# Patient Record
Sex: FEMALE | Race: WHITE | NOT HISPANIC OR LATINO
[De-identification: names, ages, dates, MRNs, and addresses within clinical notes are randomized per-mention and may not be internally consistent; named-entity substitution may affect disease eponyms.]

---

## 2017-01-30 ENCOUNTER — RECORD ABSTRACTING (OUTPATIENT)
Age: 72
End: 2017-01-30

## 2017-01-30 DIAGNOSIS — M17.9 OSTEOARTHRITIS OF KNEE, UNSPECIFIED: ICD-10-CM

## 2017-03-06 ENCOUNTER — OUTPATIENT (OUTPATIENT)
Dept: OUTPATIENT SERVICES | Facility: HOSPITAL | Age: 72
LOS: 1 days | End: 2017-03-06
Payer: MEDICARE

## 2017-03-06 VITALS
TEMPERATURE: 98 F | HEIGHT: 63.5 IN | WEIGHT: 277.78 LBS | HEART RATE: 60 BPM | RESPIRATION RATE: 20 BRPM | DIASTOLIC BLOOD PRESSURE: 75 MMHG | SYSTOLIC BLOOD PRESSURE: 136 MMHG

## 2017-03-06 DIAGNOSIS — Z01.818 ENCOUNTER FOR OTHER PREPROCEDURAL EXAMINATION: ICD-10-CM

## 2017-03-06 DIAGNOSIS — Z98.89 OTHER SPECIFIED POSTPROCEDURAL STATES: Chronic | ICD-10-CM

## 2017-03-06 DIAGNOSIS — Z95.0 PRESENCE OF CARDIAC PACEMAKER: Chronic | ICD-10-CM

## 2017-03-06 DIAGNOSIS — M17.12 UNILATERAL PRIMARY OSTEOARTHRITIS, LEFT KNEE: ICD-10-CM

## 2017-03-06 DIAGNOSIS — Z98.890 OTHER SPECIFIED POSTPROCEDURAL STATES: Chronic | ICD-10-CM

## 2017-03-06 DIAGNOSIS — Z90.49 ACQUIRED ABSENCE OF OTHER SPECIFIED PARTS OF DIGESTIVE TRACT: Chronic | ICD-10-CM

## 2017-03-06 LAB
ALBUMIN SERPL ELPH-MCNC: 4.3 G/DL — SIGNIFICANT CHANGE UP (ref 3.3–5.2)
ALP SERPL-CCNC: 87 U/L — SIGNIFICANT CHANGE UP (ref 40–120)
ALT FLD-CCNC: 19 U/L — SIGNIFICANT CHANGE UP
ANION GAP SERPL CALC-SCNC: 15 MMOL/L — SIGNIFICANT CHANGE UP (ref 5–17)
APPEARANCE UR: CLEAR — SIGNIFICANT CHANGE UP
APTT BLD: 43.2 SEC — HIGH (ref 27.5–37.4)
AST SERPL-CCNC: 28 U/L — SIGNIFICANT CHANGE UP
BILIRUB SERPL-MCNC: 0.6 MG/DL — SIGNIFICANT CHANGE UP (ref 0.4–2)
BILIRUB UR-MCNC: NEGATIVE — SIGNIFICANT CHANGE UP
BLD GP AB SCN SERPL QL: SIGNIFICANT CHANGE UP
BUN SERPL-MCNC: 19 MG/DL — SIGNIFICANT CHANGE UP (ref 8–20)
CALCIUM SERPL-MCNC: 9.6 MG/DL — SIGNIFICANT CHANGE UP (ref 8.6–10.2)
CHLORIDE SERPL-SCNC: 100 MMOL/L — SIGNIFICANT CHANGE UP (ref 98–107)
CO2 SERPL-SCNC: 26 MMOL/L — SIGNIFICANT CHANGE UP (ref 22–29)
COLOR SPEC: YELLOW — SIGNIFICANT CHANGE UP
CREAT SERPL-MCNC: 0.65 MG/DL — SIGNIFICANT CHANGE UP (ref 0.5–1.3)
DIFF PNL FLD: ABNORMAL
GLUCOSE SERPL-MCNC: 82 MG/DL — SIGNIFICANT CHANGE UP (ref 70–115)
GLUCOSE UR QL: NEGATIVE MG/DL — SIGNIFICANT CHANGE UP
HCT VFR BLD CALC: 37.3 % — SIGNIFICANT CHANGE UP (ref 37–47)
HGB BLD-MCNC: 12.3 G/DL — SIGNIFICANT CHANGE UP (ref 12–16)
INR BLD: 1.87 RATIO — HIGH (ref 0.88–1.16)
KETONES UR-MCNC: NEGATIVE — SIGNIFICANT CHANGE UP
LEUKOCYTE ESTERASE UR-ACNC: ABNORMAL
MCHC RBC-ENTMCNC: 30.1 PG — SIGNIFICANT CHANGE UP (ref 27–31)
MCHC RBC-ENTMCNC: 33 G/DL — SIGNIFICANT CHANGE UP (ref 32–36)
MCV RBC AUTO: 91.4 FL — SIGNIFICANT CHANGE UP (ref 81–99)
NITRITE UR-MCNC: NEGATIVE — SIGNIFICANT CHANGE UP
PH UR: 5 — SIGNIFICANT CHANGE UP (ref 4.8–8)
PLATELET # BLD AUTO: 253 K/UL — SIGNIFICANT CHANGE UP (ref 150–400)
POTASSIUM SERPL-MCNC: 3.9 MMOL/L — SIGNIFICANT CHANGE UP (ref 3.5–5.3)
POTASSIUM SERPL-SCNC: 3.9 MMOL/L — SIGNIFICANT CHANGE UP (ref 3.5–5.3)
PROT SERPL-MCNC: 7.9 G/DL — SIGNIFICANT CHANGE UP (ref 6.6–8.7)
PROT UR-MCNC: NEGATIVE MG/DL — SIGNIFICANT CHANGE UP
PROTHROM AB SERPL-ACNC: 20.7 SEC — HIGH (ref 10–13.1)
RBC # BLD: 4.08 M/UL — LOW (ref 4.4–5.2)
RBC # FLD: 13.8 % — SIGNIFICANT CHANGE UP (ref 11–15.6)
SODIUM SERPL-SCNC: 141 MMOL/L — SIGNIFICANT CHANGE UP (ref 135–145)
SP GR SPEC: 1.01 — SIGNIFICANT CHANGE UP (ref 1.01–1.02)
TYPE + AB SCN PNL BLD: SIGNIFICANT CHANGE UP
UROBILINOGEN FLD QL: NEGATIVE MG/DL — SIGNIFICANT CHANGE UP
WBC # BLD: 6.4 K/UL — SIGNIFICANT CHANGE UP (ref 4.8–10.8)
WBC # FLD AUTO: 6.4 K/UL — SIGNIFICANT CHANGE UP (ref 4.8–10.8)

## 2017-03-06 PROCEDURE — 87640 STAPH A DNA AMP PROBE: CPT

## 2017-03-06 PROCEDURE — 80053 COMPREHEN METABOLIC PANEL: CPT

## 2017-03-06 PROCEDURE — 85610 PROTHROMBIN TIME: CPT

## 2017-03-06 PROCEDURE — 93005 ELECTROCARDIOGRAM TRACING: CPT

## 2017-03-06 PROCEDURE — 86850 RBC ANTIBODY SCREEN: CPT

## 2017-03-06 PROCEDURE — 81001 URINALYSIS AUTO W/SCOPE: CPT

## 2017-03-06 PROCEDURE — 85027 COMPLETE CBC AUTOMATED: CPT

## 2017-03-06 PROCEDURE — 86901 BLOOD TYPING SEROLOGIC RH(D): CPT

## 2017-03-06 PROCEDURE — 87086 URINE CULTURE/COLONY COUNT: CPT

## 2017-03-06 PROCEDURE — 86900 BLOOD TYPING SEROLOGIC ABO: CPT

## 2017-03-06 PROCEDURE — G0463: CPT

## 2017-03-06 PROCEDURE — 87641 MR-STAPH DNA AMP PROBE: CPT

## 2017-03-06 PROCEDURE — 93010 ELECTROCARDIOGRAM REPORT: CPT

## 2017-03-06 PROCEDURE — 85730 THROMBOPLASTIN TIME PARTIAL: CPT

## 2017-03-06 NOTE — H&P PST ADULT - FAMILY HISTORY
Father  Still living? No  CAD (coronary artery disease), Age at diagnosis: Age Unknown  Family history of coronary artery disease, Age at diagnosis: Age Unknown  Family history of diabetes mellitus, Age at diagnosis: Age Unknown     Mother  Still living? Unknown  Family history of aortic dissection, Age at diagnosis: Age Unknown  Family history of lung cancer, Age at diagnosis: Age Unknown

## 2017-03-06 NOTE — H&P PST ADULT - NEGATIVE GENERAL GENITOURINARY SYMPTOMS
no gas in urine/no urine discoloration/no urinary hesitancy/no bladder infections/no renal colic/normal libido/normal urinary frequency/no nocturia/no flank pain L/no flank pain R/no dysuria/no hematuria

## 2017-03-06 NOTE — H&P PST ADULT - MUSCULOSKELETAL
details… detailed exam decreased ROM due to pain/no calf tenderness/no joint swelling/no joint erythema/no joint warmth

## 2017-03-06 NOTE — H&P PST ADULT - PSH
H/O prior ablation treatment  cardiac  H/O umbilical hernia repair    History of appendectomy    Left heart catheterization    Presence of cardiac pacemaker  [employer] dual chamber  S/P anal fissurectomy    S/P cholecystectomy    S/P knee surgery  right  2013  S/P tonsillectomy

## 2017-03-06 NOTE — H&P PST ADULT - NEGATIVE OPHTHALMOLOGIC SYMPTOMS
no pain R/no blurred vision R/no loss of vision R/no scleral injection R/no lacrimation R/no pain L/no discharge L/no irritation L/no irritation R/no loss of vision L/no lacrimation L/no blurred vision L/no scleral injection L/no discharge R/no diplopia/no photophobia

## 2017-03-06 NOTE — H&P PST ADULT - NEGATIVE PSYCHIATRIC SYMPTOMS
no paranoia/no mood swings/no anxiety/no suicidal ideation/no memory loss/no agitation/no auditory hallucinations/no hyperactivity/no depression/no visual hallucinations/no insomnia

## 2017-03-06 NOTE — H&P PST ADULT - NEGATIVE GENERAL SYMPTOMS
no polyuria/no weight gain/no chills/no anorexia/no sweating/no weight loss/no polyphagia/no fever/no polydipsia/no malaise

## 2017-03-06 NOTE — H&P PST ADULT - LAB RESULTS AND INTERPRETATION
Patient lab results + for MRSA and MSSA , coag elevated PT 20.7, APTT 43.2 and INR 1.87 results faxed to Dr. Mc's office s/w [place] in office results received. All lab results faxed to Dr. Lawton. Patient aware of + MRSA and MSSA nasal swab results, mupirocin 2% ordered patient instructed to apply nasal swab as ordered for five days.

## 2017-03-06 NOTE — H&P PST ADULT - NEGATIVE CARDIOVASCULAR SYMPTOMS
no chest pain/no palpitations/no paroxysmal nocturnal dyspnea/no claudication/no peripheral edema/no orthopnea/no dyspnea on exertion

## 2017-03-06 NOTE — H&P PST ADULT - NEGATIVE MUSCULOSKELETAL SYMPTOMS
no joint swelling/no neck pain/no myalgia/no arthralgia/no muscle weakness/no muscle cramps/no arm pain L

## 2017-03-06 NOTE — H&P PST ADULT - NEUROLOGICAL DETAILS
deep reflexes intact/cranial nerves intact/alert and oriented x 3/superficial reflexes intact/normal strength/responds to verbal commands/no spontaneous movement/responds to pain/sensation intact

## 2017-03-06 NOTE — H&P PST ADULT - NEGATIVE GASTROINTESTINAL SYMPTOMS
no nausea/no hematochezia/no hiccoughs/no vomiting/no constipation/no melena/no steatorrhea/no abdominal pain/no diarrhea/no flatulence/no jaundice/no change in bowel habits

## 2017-03-06 NOTE — H&P PST ADULT - NEGATIVE ENMT SYMPTOMS
no ear pain/no nasal discharge/no dysphagia/no gum bleeding/no abnormal taste sensation/no throat pain/no nose bleeds/no dry mouth/no post-nasal discharge/no recurrent cold sores/no nasal congestion/no vertigo/no tinnitus/no nasal obstruction/no hearing difficulty/no sinus symptoms

## 2017-03-06 NOTE — H&P PST ADULT - NEGATIVE NEUROLOGICAL SYMPTOMS
no transient paralysis/no vertigo/no difficulty walking/no paresthesias/no syncope/no hemiparesis/no focal seizures/no weakness/no loss of consciousness/no headache/no generalized seizures/no facial palsy/no tremors/no confusion/no loss of sensation

## 2017-03-06 NOTE — H&P PST ADULT - PMH
Arthritis    Hyperlipidemia    Hypertension    Mitral regurgitation    Narrow angle glaucoma suspect    Near syncope    Obesity    Paroxysmal atrial fibrillation    Renal stone    Sleep apnea, unspecified type    SSS (sick sinus syndrome)

## 2017-03-06 NOTE — H&P PST ADULT - NEGATIVE BREAST SYMPTOMS
no nipple discharge R/no breast tenderness R/no nipple discharge L/no breast lump L/no breast tenderness L/no breast lump R

## 2017-03-06 NOTE — PATIENT PROFILE ADULT. - LEARNING ASSESSMENT (PATIENT) ADDITIONAL COMMENTS
Instructed pt on pre-op instructions, tips for safer surgery, pain management scale and pre-surgical infection prevention instruction, MRSA/ MSSA instructions, Flu vaccine risks/benefits info sheet and verbalized understanding of all. Ebola screening: negative.

## 2017-03-06 NOTE — H&P PST ADULT - NEGATIVE FEMALE-SPECIFIC SYMPTOMS
no pelvic pain/no irregular menses/no vaginal discharge/no amenorrhea/no spotting/no abnormal vaginal bleeding/no dysmenorrhea/no menorrhagia

## 2017-03-06 NOTE — H&P PST ADULT - NEGATIVE SKIN SYMPTOMS
no itching/no pitted nails/no hair loss/no brittle nails/no dryness/no rash/no change in size/color of mole/no tumor

## 2017-03-06 NOTE — H&P PST ADULT - RS GEN PE MLT RESP DETAILS PC
breath sounds equal/no rales/no chest wall tenderness/no subcutaneous emphysema/good air movement/no intercostal retractions/no rhonchi/airway patent/no wheezes/respirations non-labored/clear to auscultation bilaterally

## 2017-03-06 NOTE — H&P PST ADULT - GASTROINTESTINAL DETAILS
no rigidity/no rebound tenderness/bowel sounds normal/no masses palpable/soft/no guarding/no distention/no bruit/no organomegaly

## 2017-03-07 ENCOUNTER — APPOINTMENT (OUTPATIENT)
Dept: PULMONOLOGY | Facility: CLINIC | Age: 72
End: 2017-03-07

## 2017-03-07 VITALS — WEIGHT: 276 LBS | HEIGHT: 64 IN | BODY MASS INDEX: 47.12 KG/M2

## 2017-03-07 VITALS
RESPIRATION RATE: 16 BRPM | HEART RATE: 63 BPM | SYSTOLIC BLOOD PRESSURE: 128 MMHG | DIASTOLIC BLOOD PRESSURE: 80 MMHG | OXYGEN SATURATION: 99 %

## 2017-03-07 DIAGNOSIS — Z01.811 ENCOUNTER FOR PREPROCEDURAL RESPIRATORY EXAMINATION: ICD-10-CM

## 2017-03-07 DIAGNOSIS — M17.12 UNILATERAL PRIMARY OSTEOARTHRITIS, LEFT KNEE: ICD-10-CM

## 2017-03-07 DIAGNOSIS — Z01.818 ENCOUNTER FOR OTHER PREPROCEDURAL EXAMINATION: ICD-10-CM

## 2017-03-07 LAB
CULTURE RESULTS: NO GROWTH — SIGNIFICANT CHANGE UP
MRSA PCR RESULT.: DETECTED
S AUREUS DNA NOSE QL NAA+PROBE: DETECTED
SPECIMEN SOURCE: SIGNIFICANT CHANGE UP

## 2017-03-07 RX ORDER — MUPIROCIN 20 MG/G
1 OINTMENT TOPICAL
Qty: 1 | Refills: 0 | OUTPATIENT
Start: 2017-03-07 | End: 2017-03-12

## 2017-03-08 ENCOUNTER — APPOINTMENT (OUTPATIENT)
Dept: FAMILY MEDICINE | Facility: CLINIC | Age: 72
End: 2017-03-08

## 2017-03-08 VITALS — HEIGHT: 64 IN | WEIGHT: 276 LBS | HEART RATE: 64 BPM | BODY MASS INDEX: 47.12 KG/M2

## 2017-03-08 DIAGNOSIS — I10 ESSENTIAL (PRIMARY) HYPERTENSION: ICD-10-CM

## 2017-03-09 PROBLEM — I10 BENIGN ESSENTIAL HYPERTENSION: Status: ACTIVE | Noted: 2017-03-09

## 2017-03-15 RX ORDER — GABAPENTIN 400 MG/1
600 CAPSULE ORAL ONCE
Qty: 0 | Refills: 0 | Status: COMPLETED | OUTPATIENT
Start: 2017-03-20 | End: 2017-03-20

## 2017-03-15 RX ORDER — OXYCODONE HYDROCHLORIDE 5 MG/1
10 TABLET ORAL ONCE
Qty: 0 | Refills: 0 | Status: DISCONTINUED | OUTPATIENT
Start: 2017-03-20 | End: 2017-03-20

## 2017-03-15 RX ORDER — CELECOXIB 200 MG/1
400 CAPSULE ORAL ONCE
Qty: 0 | Refills: 0 | Status: COMPLETED | OUTPATIENT
Start: 2017-03-20 | End: 2017-03-20

## 2017-03-19 ENCOUNTER — RESULT REVIEW (OUTPATIENT)
Age: 72
End: 2017-03-19

## 2017-03-20 ENCOUNTER — APPOINTMENT (OUTPATIENT)
Dept: ORTHOPEDIC SURGERY | Facility: HOSPITAL | Age: 72
End: 2017-03-20

## 2017-03-20 ENCOUNTER — INPATIENT (INPATIENT)
Facility: HOSPITAL | Age: 72
LOS: 3 days | Discharge: HOME | DRG: 470 | End: 2017-03-24
Attending: ORTHOPAEDIC SURGERY | Admitting: ORTHOPAEDIC SURGERY
Payer: MEDICARE

## 2017-03-20 VITALS
SYSTOLIC BLOOD PRESSURE: 153 MMHG | OXYGEN SATURATION: 100 % | TEMPERATURE: 98 F | WEIGHT: 276.9 LBS | HEIGHT: 63 IN | HEART RATE: 69 BPM | RESPIRATION RATE: 16 BRPM | DIASTOLIC BLOOD PRESSURE: 69 MMHG

## 2017-03-20 DIAGNOSIS — Z98.890 OTHER SPECIFIED POSTPROCEDURAL STATES: Chronic | ICD-10-CM

## 2017-03-20 DIAGNOSIS — Z98.89 OTHER SPECIFIED POSTPROCEDURAL STATES: Chronic | ICD-10-CM

## 2017-03-20 DIAGNOSIS — I48.0 PAROXYSMAL ATRIAL FIBRILLATION: ICD-10-CM

## 2017-03-20 DIAGNOSIS — I10 ESSENTIAL (PRIMARY) HYPERTENSION: ICD-10-CM

## 2017-03-20 DIAGNOSIS — M17.12 UNILATERAL PRIMARY OSTEOARTHRITIS, LEFT KNEE: ICD-10-CM

## 2017-03-20 DIAGNOSIS — Z90.49 ACQUIRED ABSENCE OF OTHER SPECIFIED PARTS OF DIGESTIVE TRACT: Chronic | ICD-10-CM

## 2017-03-20 DIAGNOSIS — Z95.0 PRESENCE OF CARDIAC PACEMAKER: Chronic | ICD-10-CM

## 2017-03-20 LAB
APTT BLD: 31.5 SEC — SIGNIFICANT CHANGE UP (ref 27.5–37.4)
BLD GP AB SCN SERPL QL: SIGNIFICANT CHANGE UP
INR BLD: 1.09 RATIO — SIGNIFICANT CHANGE UP (ref 0.88–1.16)
PROTHROM AB SERPL-ACNC: 12 SEC — SIGNIFICANT CHANGE UP (ref 10–13.1)
TYPE + AB SCN PNL BLD: SIGNIFICANT CHANGE UP

## 2017-03-20 PROCEDURE — 27447 TOTAL KNEE ARTHROPLASTY: CPT | Mod: AS,LT

## 2017-03-20 PROCEDURE — 20985 CPTR-ASST DIR MS PX: CPT | Mod: LT

## 2017-03-20 PROCEDURE — 88311 DECALCIFY TISSUE: CPT | Mod: 26

## 2017-03-20 PROCEDURE — 73560 X-RAY EXAM OF KNEE 1 OR 2: CPT | Mod: 26,LT

## 2017-03-20 PROCEDURE — 93010 ELECTROCARDIOGRAM REPORT: CPT

## 2017-03-20 PROCEDURE — 20985 CPTR-ASST DIR MS PX: CPT | Mod: AS,LT

## 2017-03-20 PROCEDURE — 99222 1ST HOSP IP/OBS MODERATE 55: CPT

## 2017-03-20 PROCEDURE — 27447 TOTAL KNEE ARTHROPLASTY: CPT | Mod: LT

## 2017-03-20 PROCEDURE — 88305 TISSUE EXAM BY PATHOLOGIST: CPT | Mod: 26

## 2017-03-20 RX ORDER — CELECOXIB 200 MG/1
200 CAPSULE ORAL
Qty: 0 | Refills: 0 | Status: DISCONTINUED | OUTPATIENT
Start: 2017-03-22 | End: 2017-03-24

## 2017-03-20 RX ORDER — CEFAZOLIN SODIUM 1 G
3000 VIAL (EA) INJECTION
Qty: 0 | Refills: 0 | Status: COMPLETED | OUTPATIENT
Start: 2017-03-20 | End: 2017-03-21

## 2017-03-20 RX ORDER — OXYCODONE HYDROCHLORIDE 5 MG/1
10 TABLET ORAL EVERY 4 HOURS
Qty: 0 | Refills: 0 | Status: DISCONTINUED | OUTPATIENT
Start: 2017-03-20 | End: 2017-03-21

## 2017-03-20 RX ORDER — ACETAMINOPHEN 500 MG
975 TABLET ORAL EVERY 8 HOURS
Qty: 0 | Refills: 0 | Status: DISCONTINUED | OUTPATIENT
Start: 2017-03-20 | End: 2017-03-24

## 2017-03-20 RX ORDER — SODIUM CHLORIDE 9 MG/ML
1000 INJECTION, SOLUTION INTRAVENOUS
Qty: 0 | Refills: 0 | Status: DISCONTINUED | OUTPATIENT
Start: 2017-03-20 | End: 2017-03-21

## 2017-03-20 RX ORDER — ONDANSETRON 8 MG/1
4 TABLET, FILM COATED ORAL EVERY 6 HOURS
Qty: 0 | Refills: 0 | Status: DISCONTINUED | OUTPATIENT
Start: 2017-03-20 | End: 2017-03-24

## 2017-03-20 RX ORDER — OXYCODONE HYDROCHLORIDE 5 MG/1
5 TABLET ORAL EVERY 4 HOURS
Qty: 0 | Refills: 0 | Status: DISCONTINUED | OUTPATIENT
Start: 2017-03-20 | End: 2017-03-21

## 2017-03-20 RX ORDER — DOCUSATE SODIUM 100 MG
100 CAPSULE ORAL THREE TIMES A DAY
Qty: 0 | Refills: 0 | Status: DISCONTINUED | OUTPATIENT
Start: 2017-03-20 | End: 2017-03-24

## 2017-03-20 RX ORDER — PANTOPRAZOLE SODIUM 20 MG/1
40 TABLET, DELAYED RELEASE ORAL
Qty: 0 | Refills: 0 | Status: DISCONTINUED | OUTPATIENT
Start: 2017-03-20 | End: 2017-03-24

## 2017-03-20 RX ORDER — MAGNESIUM HYDROXIDE 400 MG/1
30 TABLET, CHEWABLE ORAL DAILY
Qty: 0 | Refills: 0 | Status: DISCONTINUED | OUTPATIENT
Start: 2017-03-20 | End: 2017-03-24

## 2017-03-20 RX ORDER — ATORVASTATIN CALCIUM 80 MG/1
20 TABLET, FILM COATED ORAL AT BEDTIME
Qty: 0 | Refills: 0 | Status: DISCONTINUED | OUTPATIENT
Start: 2017-03-20 | End: 2017-03-24

## 2017-03-20 RX ORDER — ONDANSETRON 8 MG/1
4 TABLET, FILM COATED ORAL ONCE
Qty: 0 | Refills: 0 | Status: COMPLETED | OUTPATIENT
Start: 2017-03-20 | End: 2017-03-20

## 2017-03-20 RX ORDER — ACETAMINOPHEN 500 MG
1000 TABLET ORAL ONCE
Qty: 0 | Refills: 0 | Status: DISCONTINUED | OUTPATIENT
Start: 2017-03-20 | End: 2017-03-20

## 2017-03-20 RX ORDER — RIVAROXABAN 15 MG-20MG
10 KIT ORAL DAILY
Qty: 0 | Refills: 0 | Status: DISCONTINUED | OUTPATIENT
Start: 2017-03-21 | End: 2017-03-23

## 2017-03-20 RX ORDER — SODIUM CHLORIDE 9 MG/ML
3 INJECTION INTRAMUSCULAR; INTRAVENOUS; SUBCUTANEOUS EVERY 8 HOURS
Qty: 0 | Refills: 0 | Status: DISCONTINUED | OUTPATIENT
Start: 2017-03-20 | End: 2017-03-20

## 2017-03-20 RX ORDER — HYDROMORPHONE HYDROCHLORIDE 2 MG/ML
0.5 INJECTION INTRAMUSCULAR; INTRAVENOUS; SUBCUTANEOUS
Qty: 0 | Refills: 0 | Status: DISCONTINUED | OUTPATIENT
Start: 2017-03-20 | End: 2017-03-21

## 2017-03-20 RX ORDER — TRANEXAMIC ACID 100 MG/ML
1250 INJECTION, SOLUTION INTRAVENOUS ONCE
Qty: 0 | Refills: 0 | Status: DISCONTINUED | OUTPATIENT
Start: 2017-03-20 | End: 2017-03-20

## 2017-03-20 RX ORDER — METOPROLOL TARTRATE 50 MG
50 TABLET ORAL
Qty: 0 | Refills: 0 | Status: DISCONTINUED | OUTPATIENT
Start: 2017-03-20 | End: 2017-03-24

## 2017-03-20 RX ORDER — ACETAMINOPHEN 500 MG
1000 TABLET ORAL
Qty: 0 | Refills: 0 | Status: COMPLETED | OUTPATIENT
Start: 2017-03-20 | End: 2017-03-21

## 2017-03-20 RX ORDER — HYDROMORPHONE HYDROCHLORIDE 2 MG/ML
0.5 INJECTION INTRAMUSCULAR; INTRAVENOUS; SUBCUTANEOUS EVERY 4 HOURS
Qty: 0 | Refills: 0 | Status: DISCONTINUED | OUTPATIENT
Start: 2017-03-20 | End: 2017-03-21

## 2017-03-20 RX ORDER — VANCOMYCIN HCL 1 G
1750 VIAL (EA) INTRAVENOUS
Qty: 0 | Refills: 0 | Status: COMPLETED | OUTPATIENT
Start: 2017-03-20 | End: 2017-03-21

## 2017-03-20 RX ORDER — KETOROLAC TROMETHAMINE 30 MG/ML
15 SYRINGE (ML) INJECTION EVERY 6 HOURS
Qty: 0 | Refills: 0 | Status: DISCONTINUED | OUTPATIENT
Start: 2017-03-20 | End: 2017-03-21

## 2017-03-20 RX ORDER — CEFAZOLIN SODIUM 1 G
3000 VIAL (EA) INJECTION ONCE
Qty: 0 | Refills: 0 | Status: DISCONTINUED | OUTPATIENT
Start: 2017-03-20 | End: 2017-03-20

## 2017-03-20 RX ORDER — SODIUM CHLORIDE 9 MG/ML
1000 INJECTION, SOLUTION INTRAVENOUS
Qty: 0 | Refills: 0 | Status: DISCONTINUED | OUTPATIENT
Start: 2017-03-20 | End: 2017-03-23

## 2017-03-20 RX ORDER — BUPIVACAINE 13.3 MG/ML
20 INJECTION, SUSPENSION, LIPOSOMAL INFILTRATION ONCE
Qty: 0 | Refills: 0 | Status: DISCONTINUED | OUTPATIENT
Start: 2017-03-20 | End: 2017-03-20

## 2017-03-20 RX ORDER — POLYETHYLENE GLYCOL 3350 17 G/17G
17 POWDER, FOR SOLUTION ORAL DAILY
Qty: 0 | Refills: 0 | Status: DISCONTINUED | OUTPATIENT
Start: 2017-03-20 | End: 2017-03-24

## 2017-03-20 RX ORDER — METOPROLOL TARTRATE 50 MG
25 TABLET ORAL AT BEDTIME
Qty: 0 | Refills: 0 | Status: DISCONTINUED | OUTPATIENT
Start: 2017-03-20 | End: 2017-03-24

## 2017-03-20 RX ORDER — SENNA PLUS 8.6 MG/1
2 TABLET ORAL AT BEDTIME
Qty: 0 | Refills: 0 | Status: DISCONTINUED | OUTPATIENT
Start: 2017-03-20 | End: 2017-03-24

## 2017-03-20 RX ORDER — VANCOMYCIN HCL 1 G
1750 VIAL (EA) INTRAVENOUS ONCE
Qty: 0 | Refills: 0 | Status: COMPLETED | OUTPATIENT
Start: 2017-03-20 | End: 2017-03-20

## 2017-03-20 RX ADMIN — ONDANSETRON 4 MILLIGRAM(S): 8 TABLET, FILM COATED ORAL at 19:20

## 2017-03-20 RX ADMIN — ONDANSETRON 4 MILLIGRAM(S): 8 TABLET, FILM COATED ORAL at 18:30

## 2017-03-20 RX ADMIN — HYDROMORPHONE HYDROCHLORIDE 0.5 MILLIGRAM(S): 2 INJECTION INTRAMUSCULAR; INTRAVENOUS; SUBCUTANEOUS at 21:36

## 2017-03-20 RX ADMIN — SODIUM CHLORIDE 80 MILLILITER(S): 9 INJECTION, SOLUTION INTRAVENOUS at 23:52

## 2017-03-20 RX ADMIN — CELECOXIB 400 MILLIGRAM(S): 200 CAPSULE ORAL at 11:16

## 2017-03-20 RX ADMIN — OXYCODONE HYDROCHLORIDE 10 MILLIGRAM(S): 5 TABLET ORAL at 11:16

## 2017-03-20 RX ADMIN — GABAPENTIN 600 MILLIGRAM(S): 400 CAPSULE ORAL at 11:16

## 2017-03-20 NOTE — CONSULT NOTE ADULT - PROBLEM SELECTOR RECOMMENDATION 9
s/p knee arthroplasty  pain control, PT/Ot   perioperative antibiotics therapy   laxative stool softener to prevent opiate induced constipation  DVT prohylaxis xarelto per ortho team; 10 mg daily convert to full dose in 2 days

## 2017-03-20 NOTE — CONSULT NOTE ADULT - SUBJECTIVE AND OBJECTIVE BOX
PMD :Dr Lawton  Chart records in the file reviewed ,  pt is seen and examined   CC : knee pain , lucked on me       HPI:  70 y/o female with h/o OA of the knee now presents for left total knee replacement due to worsening knee pain progressively worse , recently lucked on her and had to come to ED, was not able to walk on it .       PAST MEDICAL & SURGICAL HISTORY:  Renal stone  SSS (sick sinus syndrome)  Near syncope  Sleep apnea, unspecified type  Paroxysmal atrial fibrillation  Arthritis  Narrow angle glaucoma suspect  Obesity  Mitral regurgitation  Hyperlipidemia  Hypertension  Atrial fibrillation  H/O prior ablation treatment: cardiac  Left heart catheterization  Presence of cardiac pacemaker: [employer] dual chamber  S/P anal fissurectomy  S/P cholecystectomy  S/P knee surgery: right  2013  History of appendectomy  S/P qing  H/O umbilical hernia repair  S/P tonsillectomy      Social History:  Tabacco - denies  ETOH - denies  Illicit drug abuse - denies    FAMILY HISTORY:  Family history of diabetes mellitus (Father)  Family history of coronary artery disease (Father)  Family history of lung cancer (Mother)  Family history of aortic dissection (Mother)  CAD (coronary artery disease) (Father)      Allergies    tobramycin (Unknown)    Intolerances        HOME MEDICATIONS : reviewed in the file     REVIEW OF SYSTEMS:      MEDICATIONS  (STANDING):  vancomycin  IVPB 1750milliGRAM(s) IV Intermittent once  lactated ringers. 1000milliLiter(s) IV Continuous <Continuous>  ondansetron Injectable 4milliGRAM(s) IV Push once    MEDICATIONS  (PRN):  HYDROmorphone  Injectable 0.5milliGRAM(s) IV Push every 10 minutes PRN Moderate Pain (4 - 6)  ondansetron Injectable 4milliGRAM(s) IV Push once PRN Nausea and/or Vomiting      Vital Signs Last 24 Hrs  T(C): 36.7, Max: 36.7 (03-20 @ 10:32)  T(F): 98.1, Max: 98.1 (03-20 @ 10:32)  HR: 69 (69 - 69)  BP: 153/69 (153/69 - 153/69)  BP(mean): --  RR: 16 (16 - 16)  SpO2: 100% (100% - 100%)    PHYSICAL EXAM:    GENERAL: NAD, well-groomed, well-developed  HEAD:  Atraumatic, Normocephalic  EYES: EOMI, PERRLA, conjunctiva and sclera clear  NECK: Supple, No JVD, Normal thyroid  NERVOUS SYSTEM:  Alert & Oriented X3, mowes upper extr, numb in lower extremity due to spinal anesthesia   CHEST/LUNG: CTA  b/l,  no rales, rhonchi, wheezing, or rubs  HEART: Regular rate and rhythm; No murmurs, rubs, or gallops  ABDOMEN: Soft, Nontender, Nondistended; Bowel sounds present  EXTREMITIES:  2+ Peripheral Pulses, No clubbing, cyanosis, or edema ,   LABS:      preop labs reviewed in file   Complete Blood Count (03.06.17 @ 17:12)    WBC Count: 6.4 K/uL    RBC Count: 4.08 M/uL    Hemoglobin: 12.3 g/dL    Hematocrit: 37.3 %    Mean Cell Volume: 91.4 fl    Mean Cell Hemoglobin: 30.1 pg    Mean Cell Hemoglobin Conc: 33.0 g/dL    Red Cell Distrib Width: 13.8 %    Platelet Count - Automated: 253 K/uL    Comprehensive Metabolic Panel (03.06.17 @ 17:12)    Sodium, Serum: 141 mmol/L    Potassium, Serum: 3.9 mmol/L    Chloride, Serum: 100 mmol/L    Carbon Dioxide, Serum: 26.0 mmol/L    Anion Gap, Serum: 15 mmol/L    Blood Urea Nitrogen, Serum: 19.0 mg/dL    Creatinine, Serum: 0.65 mg/dL    Glucose, Serum: 82 mg/dL    Calcium, Total Serum: 9.6 mg/dL    Protein Total, Serum: 7.9 g/dL    Albumin, Serum: 4.3 g/dL    Bilirubin Total, Serum: 0.6 mg/dL    Alkaline Phosphatase, Serum: 87 U/L    Aspartate Aminotransferase (AST/SGOT): 28 U/L    Alanine Aminotransferase (ALT/SGPT): 19 U/L     PT/INR - ( 20 Mar 2017 11:36 )   PT: 12.0 sec;   INR: 1.09 ratio         PTT - ( 20 Mar 2017 11:36 )  PTT:31.5 sec        RADIOLOGY & ADDITIONAL STUDIES:

## 2017-03-20 NOTE — PROGRESS NOTE ADULT - SUBJECTIVE AND OBJECTIVE BOX
CHARLES BLAKEISREALDIANNE  [id number]    History: 71y Female is status post left total knee arthroplasty on 3/20/17, POD # 0. Patient is doing well and is comfortable. The patient's pain is controlled using the prescribed pain medications. Denies nausea, vomiting, chest pain, shortness of breath, abdominal pain or fever. No new complaints.    MEDICATIONS  (STANDING):  vancomycin  IVPB 1750milliGRAM(s) IV Intermittent once  lactated ringers. 1000milliLiter(s) IV Continuous <Continuous>  lactated ringers. 1000milliLiter(s) IV Continuous <Continuous>  acetaminophen   Tablet. 975milliGRAM(s) Oral every 8 hours  ketorolac   Injectable 15milliGRAM(s) IV Push every 6 hours  polyethylene glycol 3350 17Gram(s) Oral daily  docusate sodium 100milliGRAM(s) Oral three times a day  multivitamin 1Tablet(s) Oral daily  acetaminophen  IVPB. 1000milliGRAM(s) IV Intermittent <User Schedule>  ondansetron Injectable 4milliGRAM(s) IV Push once  atorvastatin 20milliGRAM(s) Oral at bedtime  metoprolol 25milliGRAM(s) Oral at bedtime  metoprolol 50milliGRAM(s) Oral before breakfast  pantoprazole    Tablet 40milliGRAM(s) Oral before breakfast  vancomycin  IVPB 1750milliGRAM(s) IV Intermittent <User Schedule>    MEDICATIONS  (PRN):  HYDROmorphone  Injectable 0.5milliGRAM(s) IV Push every 10 minutes PRN Moderate Pain (4 - 6)  oxyCODONE IR 5milliGRAM(s) Oral every 4 hours PRN Mild Pain  oxyCODONE IR 10milliGRAM(s) Oral every 4 hours PRN Moderate Pain  HYDROmorphone  Injectable 0.5milliGRAM(s) IV Push every 4 hours PRN Severe Pain  aluminum hydroxide/magnesium hydroxide/simethicone Suspension 30milliLiter(s) Oral four times a day PRN Indigestion  ondansetron Injectable 4milliGRAM(s) IV Push every 6 hours PRN Nausea and/or Vomiting  magnesium hydroxide Suspension 30milliLiter(s) Oral daily PRN Constipation  senna 2Tablet(s) Oral at bedtime PRN Constipation    Physical exam: The left knee dressing is clean, dry and intact. No drainage or discharge. The calf is supple nontender. Passive range of motion is acceptable to due postoperative pain. Sensation to light touch is grossly intact distally. Motor function distally is 5/5. Extensor hallucis longus and flexor hallucis longus are intact. No foot drop. 2+ dorsalis pedis pulse. Capillary refill is less than 2 seconds. No cyanosis.    Primary Orthopedic Assessment:  •	s/p LEFT total knee replacement    Plan:   •	DVT prophylaxis as prescribed, including use of compression devices and ankle pumps  •	Physical therapy  •	Weightbearing as tolerated of the left lower extremity with assistance of a walker  •	Incentive spirometry encouraged  •	Pain control as clinically indicated  •	Discharge planning

## 2017-03-20 NOTE — CONSULT NOTE ADULT - ASSESSMENT
72 yo female with h/p Atrial fib/PPM , HTN and osteoarthritis of knee s/p left knee arthroplasty post op seen in recovery for medical evaluation   discussed with orthopedics physician assistant

## 2017-03-21 ENCOUNTER — TRANSCRIPTION ENCOUNTER (OUTPATIENT)
Age: 72
End: 2017-03-21

## 2017-03-21 RX ORDER — OXYCODONE HYDROCHLORIDE 5 MG/1
10 TABLET ORAL EVERY 4 HOURS
Qty: 0 | Refills: 0 | Status: DISCONTINUED | OUTPATIENT
Start: 2017-03-21 | End: 2017-03-24

## 2017-03-21 RX ORDER — OXYCODONE HYDROCHLORIDE 5 MG/1
5 TABLET ORAL EVERY 4 HOURS
Qty: 0 | Refills: 0 | Status: DISCONTINUED | OUTPATIENT
Start: 2017-03-21 | End: 2017-03-24

## 2017-03-21 RX ORDER — HYDROMORPHONE HYDROCHLORIDE 2 MG/ML
0.5 INJECTION INTRAMUSCULAR; INTRAVENOUS; SUBCUTANEOUS EVERY 4 HOURS
Qty: 0 | Refills: 0 | Status: DISCONTINUED | OUTPATIENT
Start: 2017-03-21 | End: 2017-03-24

## 2017-03-21 RX ADMIN — PANTOPRAZOLE SODIUM 40 MILLIGRAM(S): 20 TABLET, DELAYED RELEASE ORAL at 05:43

## 2017-03-21 RX ADMIN — OXYCODONE HYDROCHLORIDE 10 MILLIGRAM(S): 5 TABLET ORAL at 11:10

## 2017-03-21 RX ADMIN — Medication 975 MILLIGRAM(S): at 13:11

## 2017-03-21 RX ADMIN — ONDANSETRON 4 MILLIGRAM(S): 8 TABLET, FILM COATED ORAL at 10:43

## 2017-03-21 RX ADMIN — Medication 100 MILLIGRAM(S): at 21:15

## 2017-03-21 RX ADMIN — Medication 1 TABLET(S): at 13:11

## 2017-03-21 RX ADMIN — Medication 975 MILLIGRAM(S): at 21:15

## 2017-03-21 RX ADMIN — ONDANSETRON 4 MILLIGRAM(S): 8 TABLET, FILM COATED ORAL at 17:37

## 2017-03-21 RX ADMIN — Medication 975 MILLIGRAM(S): at 00:04

## 2017-03-21 RX ADMIN — ATORVASTATIN CALCIUM 20 MILLIGRAM(S): 80 TABLET, FILM COATED ORAL at 21:15

## 2017-03-21 RX ADMIN — Medication 25 MILLIGRAM(S): at 21:15

## 2017-03-21 RX ADMIN — OXYCODONE HYDROCHLORIDE 10 MILLIGRAM(S): 5 TABLET ORAL at 05:47

## 2017-03-21 RX ADMIN — Medication 15 MILLIGRAM(S): at 01:23

## 2017-03-21 RX ADMIN — RIVAROXABAN 10 MILLIGRAM(S): KIT at 13:11

## 2017-03-21 RX ADMIN — Medication 1000 MILLIGRAM(S): at 06:11

## 2017-03-21 RX ADMIN — Medication 15 MILLIGRAM(S): at 00:53

## 2017-03-21 RX ADMIN — Medication 975 MILLIGRAM(S): at 23:12

## 2017-03-21 RX ADMIN — Medication 975 MILLIGRAM(S): at 01:00

## 2017-03-21 RX ADMIN — Medication 250 MILLIGRAM(S): at 02:32

## 2017-03-21 RX ADMIN — Medication 100 MILLIGRAM(S): at 13:11

## 2017-03-21 RX ADMIN — Medication 100 MILLIGRAM(S): at 00:49

## 2017-03-21 RX ADMIN — Medication 15 MILLIGRAM(S): at 05:43

## 2017-03-21 RX ADMIN — Medication 975 MILLIGRAM(S): at 14:09

## 2017-03-21 RX ADMIN — HYDROMORPHONE HYDROCHLORIDE 0.5 MILLIGRAM(S): 2 INJECTION INTRAMUSCULAR; INTRAVENOUS; SUBCUTANEOUS at 13:30

## 2017-03-21 RX ADMIN — POLYETHYLENE GLYCOL 3350 17 GRAM(S): 17 POWDER, FOR SOLUTION ORAL at 13:12

## 2017-03-21 RX ADMIN — OXYCODONE HYDROCHLORIDE 10 MILLIGRAM(S): 5 TABLET ORAL at 10:42

## 2017-03-21 RX ADMIN — HYDROMORPHONE HYDROCHLORIDE 0.5 MILLIGRAM(S): 2 INJECTION INTRAMUSCULAR; INTRAVENOUS; SUBCUTANEOUS at 13:06

## 2017-03-21 RX ADMIN — Medication 100 MILLIGRAM(S): at 05:42

## 2017-03-21 RX ADMIN — OXYCODONE HYDROCHLORIDE 10 MILLIGRAM(S): 5 TABLET ORAL at 21:16

## 2017-03-21 RX ADMIN — OXYCODONE HYDROCHLORIDE 10 MILLIGRAM(S): 5 TABLET ORAL at 22:00

## 2017-03-21 RX ADMIN — Medication 200 MILLIGRAM(S): at 10:34

## 2017-03-21 RX ADMIN — Medication 50 MILLIGRAM(S): at 05:43

## 2017-03-21 RX ADMIN — Medication 400 MILLIGRAM(S): at 05:43

## 2017-03-21 RX ADMIN — Medication 15 MILLIGRAM(S): at 06:11

## 2017-03-21 RX ADMIN — Medication 200 MILLIGRAM(S): at 00:51

## 2017-03-21 NOTE — DISCHARGE NOTE ADULT - HAS THE PATIENT RECEIVED THE INFLUENZA VACCINE DURING THIS VISIT
as per pt she received flu shot from her primary MD, Dr. Lawton/Yes No/as per pt she received flu shot from her primary MD, Dr. Lawton

## 2017-03-21 NOTE — DISCHARGE NOTE ADULT - HOSPITAL COURSE
The patient underwent a LEFT TOTAL KNEE REPLACEMENT on 3/20/17. The patient received antibiotics consistent with SCIP guidelines. The patient underwent the procedure and had no intra-operative complications. Post-operatively, the patient was seen by medicine and PT. The patient received XARELTO for DVTP. The patient received pain medications per orthopedic pain managment protocol and the pain was appropriately controlled. The patient did not have any post-operative medical complications. The patient was discharged in stable condition. The patient underwent a LEFT TOTAL KNEE REPLACEMENT on 3/20/17. The patient received antibiotics consistent with SCIP guidelines. The patient underwent the procedure and had no intra-operative complications. Post-operatively, the patient was seen by medicine and PT. The patient received XARELTO for DVTP. The patient received pain medications per orthopedic pain management protocol and the pain was appropriately controlled. The patient did not have any post-operative medical complications. The patient was discharged in stable condition.

## 2017-03-21 NOTE — CONSULT NOTE ADULT - ASSESSMENT
1. elderly female sp left knee replacement.  2. episode of afib post-op with hx of afib and recent a-fib ablation at [facility]. As pt has resumed SR will not change or add to medical regimen at this time (Had been on additional meds until last Oct.)  3. DUSTY-c-pap has been resumed  4. hx of hpt-currently normotensive  5. consider increasing Xarelto to full AC dose (given the recent AF) if acceptable to surgeon.

## 2017-03-21 NOTE — DISCHARGE NOTE ADULT - CARE PLAN
Principal Discharge DX:	Primary osteoarthritis of left knee  Goal:	improve pain and ability to perform ADL  Instructions for follow-up, activity and diet:	The patient will be seen in the office in 3 weeks for wound check. Staples will be removed at that time. Patient may shower after post-op day #3. The dressing is to be removed on 3/30/17. IF THE DRESSING BECOMES SOILED BEFORE THE REMOVAL DATE, CHANGE WITH A SIMILAR DRESSING. IF THE DRESSING BECOMES STAINED WITH DISCHARGE, CONTACT THE OFFICE FOR FURTHER DIRECTIONS. The patient will contact the office if the wound becomes red, has increasing pain, develops bleeding or discharge, an injury occurs, or has other concerns. The patient will continue PT consistent with total knee replacement. The patient will continue Xarelto for blood clot prevention. The patient will take OXYCODONE AND TYLENOL for pain control and titrate according to prescription and patient needs. The patient will take Colace while taking oxycodone to prevent narcotic associated constipation.  Additionally, increase water intake (drink at least 8 glasses of water daily) and try adding fiber to the diet by eating fruits, vegetables and foods that are rich in grains. If constipation is experienced, contact the medical/primary care provider to discuss further treatment options. The patient is FULL weight bearing. Elevation of the lower leg is recommended to reduce swelling. Principal Discharge DX:	Primary osteoarthritis of left knee  Goal:	improve pain and ability to perform ADL  Instructions for follow-up, activity and diet:	The patient will be seen in the office in 3 weeks for wound check. Staples will be removed at that time. Patient may shower after post-op day #5. The dressing is to be removed on 3/30/17. IF THE DRESSING BECOMES SOILED BEFORE THE REMOVAL DATE, CHANGE WITH A SIMILAR DRESSING. IF THE DRESSING BECOMES STAINED WITH DISCHARGE, CONTACT THE OFFICE FOR FURTHER DIRECTIONS. The patient will contact the office if the wound becomes red, has increasing pain, develops bleeding or discharge, an injury occurs, or has other concerns. The patient will continue PT consistent with total knee replacement. The patient will continue Xarelto for blood clot prevention. The patient will take OXYCODONE AND TYLENOL for pain control and titrate according to prescription and patient needs. The patient will take Colace while taking oxycodone to prevent narcotic associated constipation.  Additionally, increase water intake (drink at least 8 glasses of water daily) and try adding fiber to the diet by eating fruits, vegetables and foods that are rich in grains. If constipation is experienced, contact the medical/primary care provider to discuss further treatment options. The patient is FULL weight bearing. Elevation of the lower leg is recommended to reduce swelling.

## 2017-03-21 NOTE — DISCHARGE NOTE ADULT - MEDICATION SUMMARY - MEDICATIONS TO TAKE
I will START or STAY ON the medications listed below when I get home from the hospital:    3-1 commode  -- Indication: For DME    Rolling walker  -- DME  -- Indication: For DME    oxyCODONE 5 mg oral tablet  -- 1 tab(s) by mouth every 3 to 4 hours, As Needed,  MDD:8  -- Indication: For Prn pain    rivaroxaban 20 mg oral tablet  -- 1 tab(s) by mouth once a day  -- Indication: For dvtp    ZyrTEC 10 mg oral tablet  -- 1 tab(s) by mouth once a day  -- Indication: For Home med    Lipitor 20 mg oral tablet  -- 1 tab(s) by mouth once a day (at bedtime)  -- Indication: For Home med    metoprolol tartrate 25 mg oral tablet  -- 1 tab(s) by mouth once (at bedtime)  -- Indication: For Home med    metoprolol tartrate 50 mg oral tablet  -- 1 tab(s) by mouth once a day (in the morning)  -- Indication: For Home med    hydroCHLOROthiazide 25 mg oral tablet  -- 1 tab(s) by mouth once a day  -- Indication: For Home med    docusate sodium 100 mg oral capsule  -- 1 cap(s) by mouth 3 times a day  -- Indication: For Stool softener    Patanol 0.1% ophthalmic solution  -- 1 drop(s) to each affected eye 2 times a day, As Needed  -- Indication: For Home med    erythromycin 0.5% ophthalmic ointment  -- 1 application to each affected eye 4 times a day, As Needed  -- Indication: For Home med    omeprazole 40 mg oral delayed release capsule  -- 1 cap(s) by mouth once a day  -- Indication: For Home med    multivitamin  --     -- Indication: For Home med

## 2017-03-21 NOTE — PROGRESS NOTE ADULT - SUBJECTIVE AND OBJECTIVE BOX
Chief Complaint: Incisional Pain    Patient seen and examined at bedside  PCA - Dilaudid/Fentanyl/Morphine  Pain moderately controlled with current medication regimen  No new events overnight    PAST MEDICAL & SURGICAL HISTORY:  Renal stone  SSS (sick sinus syndrome)  Near syncope  Sleep apnea, unspecified type  Paroxysmal atrial fibrillation  Arthritis  Narrow angle glaucoma suspect  Obesity  Mitral regurgitation  Hyperlipidemia  Hypertension  Atrial fibrillation  H/O prior ablation treatment: cardiac  Left heart catheterization  Presence of cardiac pacemaker: [employer] dual chamber  S/P anal fissurectomy  S/P cholecystectomy  S/P knee surgery: right  2013  History of appendectomy  S/P qing  H/O umbilical hernia repair  S/P tonsillectomy      SOCIAL HISTORY:  [ ] Denies Smoking, Alcohol, or Drug Use    Allergies    tobramycin (Unknown)    Intolerances        PAIN MEDICATIONS:  acetaminophen   Tablet. 975milliGRAM(s) Oral every 8 hours  oxyCODONE IR 5milliGRAM(s) Oral every 4 hours PRN  oxyCODONE IR 10milliGRAM(s) Oral every 4 hours PRN  HYDROmorphone  Injectable 0.5milliGRAM(s) IV Push every 4 hours PRN  ondansetron Injectable 4milliGRAM(s) IV Push every 6 hours PRN    Heme:  rivaroxaban 10milliGRAM(s) Oral daily    Antibiotics:    Cardiac:  metoprolol 25milliGRAM(s) Oral at bedtime  metoprolol 50milliGRAM(s) Oral before breakfast    Pulmonary:    Endocrine  atorvastatin 20milliGRAM(s) Oral at bedtime    GI:  aluminum hydroxide/magnesium hydroxide/simethicone Suspension 30milliLiter(s) Oral four times a day PRN  magnesium hydroxide Suspension 30milliLiter(s) Oral daily PRN  polyethylene glycol 3350 17Gram(s) Oral daily  senna 2Tablet(s) Oral at bedtime PRN  docusate sodium 100milliGRAM(s) Oral three times a day  pantoprazole    Tablet 40milliGRAM(s) Oral before breakfast    All Other Meds:  lactated ringers. 1000milliLiter(s) IV Continuous <Continuous>  multivitamin 1Tablet(s) Oral daily      REVIEW OF SYSTEMS:  CONSTITUTIONAL: Negative fever,chills  NECK: No pain or stiffness  RESPIRATORY: No cough, wheezing,  No shortness of breath  GASTROINTESTINAL: No abdominal, No nausea, vomiting; No diarrhea or constipation.   SKIN: No itching, burning, rashes, or lesions   MUSCULOSKELETAL: No joint pain or swelling; No muscle, back, or extremity pain    PHYSICAL EXAM:    Vital Signs Last 24 Hrs  T(C): 36.3, Max: 36.9 (03-20 @ 17:21)  T(F): 97.4, Max: 98.4 (03-20 @ 17:21)  HR: 64 (60 - 88)  BP: 89/54 (89/54 - 166/52)  BP(mean): 79 (60 - 79)  RR: 16 (16 - 20)  SpO2: 95% (95% - 100%)    PAIN SCALE:       VNRS (Verbal Numerical Rating Scale) 1-10       GENERAL: Comfortable, in no apparent distress  HEENT:  Atraumatic, Normocephalic  NECK: Supple  LUNG: Respiration even and unlabored, no distress noted  ABDOMEN: Soft, Nontender, Nondistended; Dressing C/D/I  BACK: No midline bony tenderness to palpation, no step off or deformity noted.   EXTREMITIES:  RODRÍGUEZ X4, No clubbing, cyanosis, or edema  NEUROLOGIC: Awake, alert and oriented X3  SKIN: Warm and Dry    LABS:                          10.1   8.4   )-----------( 189      ( 21 Mar 2017 06:05 )             30.4     21 Mar 2017 06:05    130    |  95     |  20.0   ----------------------------<  163    3.6     |  27.0   |  0.64     Ca    8.4        21 Mar 2017 06:05      PT/INR - ( 20 Mar 2017 11:36 )   PT: 12.0 sec;   INR: 1.09 ratio         PTT - ( 20 Mar 2017 11:36 )  PTT:31.5 sec      Drug Screen:        RADIOLOGY:      [x ]  [place]  Reviewed and Copied to Chart  The Drug Utilization Report below displays all of the controlled substance prescriptions, if any, that your patient has filled in the last twelve months. The information displayed on this report is compiled from pharmacy submissions to the Department, and accurately reflects the information as submitted by the pharmacies.  This report was requested by: Angélica Adorno | Reference #: [id number]   Others' Prescriptions  Patient Name:	Jesenia Pandya	YOB: 1965	  Address:	[address] [place] [address]	Sex:	Female	    Rx Written	Rx Dispensed	Drug	Quantity	Days Supply	Prescriber Name			  03/17/2017	03/17/2017	oxycodone-acetaminophen  mg tab	180	30	Milana Narayan NP			  02/21/2017	02/21/2017	oxycodone-acetaminophen  mg tab	75	25	Marisabel Montague			  02/21/2017	02/21/2017	clonazepam 1 mg tablet	90	30	DemiJuna Ramon meyer JEREMI HOFF			  02/21/2017	02/21/2017	lyrica 75 mg capsule	120	30	Marisabel Mnotague			  01/20/2017	01/20/2017	oxycodone-acetaminophen  mg tab	75	25	Milana Narayan NP			  01/20/2017	01/20/2017	lyrica 75 mg capsule	120	30	Milana Narayan NP			  12/28/2016	12/28/2016	clonazepam 1 mg tablet	90	30	Bandar Keith			  12/21/2016	12/21/2016	oxycodone-acetaminophen  mg tab	75	30	Milana Narayan NP			  12/21/2016	12/21/2016	lyrica 75 mg capsule	120	30	Milana Narayan NP			  11/07/2016	11/23/2016	lyrica 75 mg capsule	120	30	Milana Narayan NP			  11/07/2016	11/08/2016	oxycodone-acetaminophen  mg tab	75	30	Milana Narayan NP			    Patient Name:	Jesenia Pandya	YOB: 1965	  Address:	[address] [place] [address]	Sex:	Female	    Rx Written	Rx Dispensed	Drug	Quantity	Days Supply	Prescriber Name			  10/31/2016	10/31/2016	clonazepam 1 mg tablet	90	30	Bandar Keith			  10/05/2016	10/06/2016	oxycodone-acetaminophen  mg tab	180	30	Milana Narayan NP			  10/05/2016	10/06/2016	lyrica 75 mg capsule	120	30	Milana Narayan NP			  09/07/2016	09/23/2016	lyrica 75 mg capsule	120	30	Milana Narayan NP			  09/16/2016	09/16/2016	clonazepam 1 mg tablet	90	30	Bandar Keith			  09/07/2016	09/07/2016	oxycodone-acetaminophen  mg tab	180	30	TelfernerMilana roche NP			  08/10/2016	08/22/2016	lyrica 75 mg capsule	120	30	SylvainMilana NP			  08/10/2016	08/12/2016	oxycodone-acetaminophen  mg tab	180	30	SylvainMilana roche NP			  08/01/2016	08/01/2016	clonazepam 1 mg tablet	90	30	Inna, Bandar BLOOD			  07/13/2016	07/18/2016	oxycodone-acetaminophen  mg tab	120	30	Myriam Laboy M, PA			  07/13/2016	07/18/2016	lyrica 75 mg capsule	120	30	Myriam Laboy M, PA			  07/01/2016	07/01/2016	clonazepam 1 mg tablet	90	30	InnaBandar			  06/13/2016	06/17/2016	oxycodone-acetaminophen  mg tab	120	30	TelfernerMilana roche NP			  06/13/2016	06/17/2016	lyrica 75 mg capsule	120	30	SylvainMilana roche NP			  05/23/2016	05/23/2016	clonazepam 1 mg tablet	60	30	Inna Bandar BLOOD			  05/17/2016	05/17/2016	oxycodone-acetaminophen  mg tab	120	30	TelfernerMilana NP			  05/17/2016	05/17/2016	lyrica 75 mg capsule	120	30	SylvainMilana NP			  04/19/2016	04/19/2016	oxycodone-acetaminophen  mg tab	120	30	TelfernerMilana roche NP			  04/19/2016	04/19/2016	lyrica 75 mg capsule	120	30	TelfernerMilana roche NP			  03/25/2016	03/25/2016	clonazepam 1 mg tablet	60	30	Lang Keithfederico BLOOD			  03/22/2016	03/23/2016	oxycodone-acetaminophen 	120	30	TelfernerMilana NP			  03/22/2016	03/23/2016	lyrica 75 mg capsule	120	30	TelfernerMilana roche NP			  * - Drugs marked with an asterisk are compound drugs. If the compound drug is made up of more than one controlled substance, then each controlled substance will be a separate row in the table.  Top of Form 1                Bottom of Form 1 Chief Complaint: Incisional Pain    Patient seen and examined at bedside  Pain moderately controlled with current medication regimen  No new events overnight    PAST MEDICAL & SURGICAL HISTORY:  Renal stone  SSS (sick sinus syndrome)  Near syncope  Sleep apnea, unspecified type  Paroxysmal atrial fibrillation  Arthritis  Narrow angle glaucoma suspect  Obesity  Mitral regurgitation  Hyperlipidemia  Hypertension  Atrial fibrillation  H/O prior ablation treatment: cardiac  Left heart catheterization  Presence of cardiac pacemaker: [employer] dual chamber  S/P anal fissurectomy  S/P cholecystectomy  S/P knee surgery: right  2013  History of appendectomy  S/P qing  H/O umbilical hernia repair  S/P tonsillectomy      SOCIAL HISTORY:  [ ] Denies Smoking, Alcohol, or Drug Use    Allergies    tobramycin (Unknown)    Intolerances        PAIN MEDICATIONS:  acetaminophen   Tablet. 975milliGRAM(s) Oral every 8 hours  oxyCODONE IR 5milliGRAM(s) Oral every 4 hours PRN  oxyCODONE IR 10milliGRAM(s) Oral every 4 hours PRN  HYDROmorphone  Injectable 0.5milliGRAM(s) IV Push every 4 hours PRN  ondansetron Injectable 4milliGRAM(s) IV Push every 6 hours PRN    Heme:  rivaroxaban 10milliGRAM(s) Oral daily    Antibiotics:    Cardiac:  metoprolol 25milliGRAM(s) Oral at bedtime  metoprolol 50milliGRAM(s) Oral before breakfast    Pulmonary:    Endocrine  atorvastatin 20milliGRAM(s) Oral at bedtime    GI:  aluminum hydroxide/magnesium hydroxide/simethicone Suspension 30milliLiter(s) Oral four times a day PRN  magnesium hydroxide Suspension 30milliLiter(s) Oral daily PRN  polyethylene glycol 3350 17Gram(s) Oral daily  senna 2Tablet(s) Oral at bedtime PRN  docusate sodium 100milliGRAM(s) Oral three times a day  pantoprazole    Tablet 40milliGRAM(s) Oral before breakfast    All Other Meds:  lactated ringers. 1000milliLiter(s) IV Continuous <Continuous>  multivitamin 1Tablet(s) Oral daily      REVIEW OF SYSTEMS:  CONSTITUTIONAL: Negative fever,chills  NECK: No pain or stiffness  RESPIRATORY: No cough, wheezing,  No shortness of breath  GASTROINTESTINAL: No abdominal, No nausea, vomiting; No diarrhea or constipation.   SKIN: No itching, burning, rashes, or lesions   MUSCULOSKELETAL: + joint pain, swelling; No muscle, back, or extremity pain    PHYSICAL EXAM:    Vital Signs Last 24 Hrs  T(C): 36.3, Max: 36.9 (03-20 @ 17:21)  T(F): 97.4, Max: 98.4 (03-20 @ 17:21)  HR: 64 (60 - 88)  BP: 89/54 (89/54 - 166/52)  BP(mean): 79 (60 - 79)  RR: 16 (16 - 20)  SpO2: 95% (95% - 100%)    PAIN SCALE:       VNRS (Verbal Numerical Rating Scale) 1-10       GENERAL: Comfortable, in no apparent distress  HEENT:  Atraumatic, Normocephalic  NECK: Supple  LUNG: Respiration even and unlabored, no distress noted  ABDOMEN: Soft, Nontender, Nondistended  BACK: No midline bony tenderness to palpation, no step off or deformity noted.   EXTREMITIES:  Left knee, Dressing C/D/I  NEUROLOGIC: Awake, alert and oriented X3  SKIN: Warm and Dry    LABS:                          10.1   8.4   )-----------( 189      ( 21 Mar 2017 06:05 )             30.4     21 Mar 2017 06:05    130    |  95     |  20.0   ----------------------------<  163    3.6     |  27.0   |  0.64     Ca    8.4        21 Mar 2017 06:05      PT/INR - ( 20 Mar 2017 11:36 )   PT: 12.0 sec;   INR: 1.09 ratio         PTT - ( 20 Mar 2017 11:36 )  PTT:31.5 sec      Drug Screen:        RADIOLOGY:      [x ]  [place]  Reviewed and Copied to Chart  The Drug Utilization Report below displays all of the controlled substance prescriptions, if any, that your patient has filled in the last twelve months. The information displayed on this report is compiled from pharmacy submissions to the Department, and accurately reflects the information as submitted by the pharmacies.  This report was requested by: Angélica Adorno | Reference #: [id number]   Others' Prescriptions  Patient Name:	Jesenia Pandya	YOB: 1965	  Address:	[address] [place] [address]	Sex:	Female	    Rx Written	Rx Dispensed	Drug	Quantity	Days Supply	Prescriber Name			  03/17/2017	03/17/2017	oxycodone-acetaminophen  mg tab	180	30	NabbMilana roche NP			  02/21/2017	02/21/2017	oxycodone-acetaminophen  mg tab	75	25	Marisabel Montague			  02/21/2017	02/21/2017	clonazepam 1 mg tablet	90	30	Juan Ramon Hannah DO			  02/21/2017	02/21/2017	lyrica 75 mg capsule	120	30	Marisabel Montague			  01/20/2017	01/20/2017	oxycodone-acetaminophen  mg tab	75	25	NabbMilana roche NP			  01/20/2017	01/20/2017	lyrica 75 mg capsule	120	30	SylvainMilana roche NP			  12/28/2016	12/28/2016	clonazepam 1 mg tablet	90	30	Bandar Keith			  12/21/2016	12/21/2016	oxycodone-acetaminophen  mg tab	75	30	Milana Narayan NP			  12/21/2016	12/21/2016	lyrica 75 mg capsule	120	30	Milana Narayan NP			  11/07/2016	11/23/2016	lyrica 75 mg capsule	120	30	Milana Narayan NP			  11/07/2016	11/08/2016	oxycodone-acetaminophen  mg tab	75	30	Milana Narayan NP			    Patient Name:	Jesenia Pandya	YOB: 1965	  Address:	[address] [place] [address]	Sex:	Female	    Rx Written	Rx Dispensed	Drug	Quantity	Days Supply	Prescriber Name			  10/31/2016	10/31/2016	clonazepam 1 mg tablet	90	30	Bandar Keith			  10/05/2016	10/06/2016	oxycodone-acetaminophen  mg tab	180	30	Milana aNrayan NP			  10/05/2016	10/06/2016	lyrica 75 mg capsule	120	30	Milana Narayan NP			  09/07/2016	09/23/2016	lyrica 75 mg capsule	120	30	Milana Narayan NP			  09/16/2016	09/16/2016	clonazepam 1 mg tablet	90	30	Bandar Keith			  09/07/2016	09/07/2016	oxycodone-acetaminophen  mg tab	180	30	NabbMilana roche NP			  08/10/2016	08/22/2016	lyrica 75 mg capsule	120	30	NabbMilana roche NP			  08/10/2016	08/12/2016	oxycodone-acetaminophen  mg tab	180	30	SylvainMilana roche NP			  08/01/2016	08/01/2016	clonazepam 1 mg tablet	90	30	Neville Keithguilherme BLOOD			  07/13/2016	07/18/2016	oxycodone-acetaminophen  mg tab	120	30	Mryiam Laboy M, PA			  07/13/2016	07/18/2016	lyrica 75 mg capsule	120	30	Myriam Laboy M, PA			  07/01/2016	07/01/2016	clonazepam 1 mg tablet	90	30	Inna Bandar BLOOD			  06/13/2016	06/17/2016	oxycodone-acetaminophen  mg tab	120	30	SylvainMilana roche NP			  06/13/2016	06/17/2016	lyrica 75 mg capsule	120	30	SylvainMilana roche NP			  05/23/2016	05/23/2016	clonazepam 1 mg tablet	60	30	Inna Bandar BLOOD			  05/17/2016	05/17/2016	oxycodone-acetaminophen  mg tab	120	30	SylvainMilana NP			  05/17/2016	05/17/2016	lyrica 75 mg capsule	120	30	SylvainMilana NP			  04/19/2016	04/19/2016	oxycodone-acetaminophen  mg tab	120	30	SylvainMilana roche NP			  04/19/2016	04/19/2016	lyrica 75 mg capsule	120	30	SylvainMilana roche NP			  03/25/2016	03/25/2016	clonazepam 1 mg tablet	60	30	Lang Keithfederico BLOOD			  03/22/2016	03/23/2016	oxycodone-acetaminophen 	120	30	SylvainMilana NP			  03/22/2016	03/23/2016	lyrica 75 mg capsule	120	30	NabbMilana NP			  * - Drugs marked with an asterisk are compound drugs. If the compound drug is made up of more than one controlled substance, then each controlled substance will be a separate row in the table.  Top of Form 1                Bottom of Form 1

## 2017-03-21 NOTE — DISCHARGE NOTE ADULT - CARE PROVIDER_API CALL
Kevin Mc), Orthopaedic Surgery  [address] [place] [id number]  Phone: [phone]  Fax: [phone]

## 2017-03-21 NOTE — DISCHARGE NOTE ADULT - PATIENT PORTAL LINK FT
“You can access the Follow[employer] Patient Portal, offered by [facility], by registering with the following website: [email or web address]”

## 2017-03-21 NOTE — PROGRESS NOTE ADULT - SUBJECTIVE AND OBJECTIVE BOX
CHARLES FELDMAN  [id number]    History: 71y Female is status post left total knee arthroplasty on 3/21. POD#1. Patient is doing well and is comfortable. The patient's pain is controlled using the prescribed pain medications. The patient will be  participating in physical therapy and stair training. Denies nausea, vomiting, chest pain, shortness of breath, abdominal pain or fever. No new complaints.                              10.1   8.4   )-----------( 189      ( 21 Mar 2017 06:05 )             30.4     21 Mar 2017 06:05    130    |  95     |  20.0   ----------------------------<  163    3.6     |  27.0   |  0.64     Ca    8.4        21 Mar 2017 06:05        MEDICATIONS  (STANDING):  vancomycin  IVPB 1750milliGRAM(s) IV Intermittent once  lactated ringers. 1000milliLiter(s) IV Continuous <Continuous>  acetaminophen   Tablet. 975milliGRAM(s) Oral every 8 hours  polyethylene glycol 3350 17Gram(s) Oral daily  docusate sodium 100milliGRAM(s) Oral three times a day  multivitamin 1Tablet(s) Oral daily  ondansetron Injectable 4milliGRAM(s) IV Push once  atorvastatin 20milliGRAM(s) Oral at bedtime  metoprolol 25milliGRAM(s) Oral at bedtime  metoprolol 50milliGRAM(s) Oral before breakfast  pantoprazole    Tablet 40milliGRAM(s) Oral before breakfast  rivaroxaban 10milliGRAM(s) Oral daily  ceFAZolin   IVPB 3000milliGRAM(s) IV Intermittent <User Schedule>    MEDICATIONS  (PRN):  oxyCODONE IR 5milliGRAM(s) Oral every 4 hours PRN Mild Pain  oxyCODONE IR 10milliGRAM(s) Oral every 4 hours PRN Moderate Pain  HYDROmorphone  Injectable 0.5milliGRAM(s) IV Push every 4 hours PRN Severe Pain  aluminum hydroxide/magnesium hydroxide/simethicone Suspension 30milliLiter(s) Oral four times a day PRN Indigestion  ondansetron Injectable 4milliGRAM(s) IV Push every 6 hours PRN Nausea and/or Vomiting  magnesium hydroxide Suspension 30milliLiter(s) Oral daily PRN Constipation  senna 2Tablet(s) Oral at bedtime PRN Constipation      Physical exam: The left knee dressing is clean, dry and intact. No drainage or discharge. No erythema is noted. No blistering. No ecchymosis. The calf is supple nontender. Ace bandage clean dry and intact.  dressings will be changed POD#2.  Passive range of motion is acceptable to due postoperative pain. No calf tenderness. Sensation to light touch is grossly intact distally. Motor function distally is 5/5. Extensor hallucis longus and flexor hallucis longus are intact. No foot drop. 2+ dorsalis pedis pulse. Capillary refill is less than 2 seconds. No cyanosis.    Primary Orthopedic Assessment:  •	s/p LEFT total knee replacement    Secondary  Orthopedic Assessment(s):   •	    Secondary  Medical Assessment(s):   •	    Plan:   •	DVT prophylaxis as prescribed, xarelto 10 mg for 2 days post op then return to regular dose of 20 mg daily.                including use of compression devices and ankle pumps  •	Continue physical therapy  •	Weightbearing as tolerated of the right lower extremity with assistance of a walker, stair trtaining  •	Incentive spirometry encouraged  •	Pain control as clinically indicated  •	Discharge planning – anticipated discharge is Home /              patient has rolling walker at home

## 2017-03-21 NOTE — DISCHARGE NOTE ADULT - PLAN OF CARE
improve pain and ability to perform ADL The patient will be seen in the office in 3 weeks for wound check. Staples will be removed at that time. Patient may shower after post-op day #3. The dressing is to be removed on 3/30/17. IF THE DRESSING BECOMES SOILED BEFORE THE REMOVAL DATE, CHANGE WITH A SIMILAR DRESSING. IF THE DRESSING BECOMES STAINED WITH DISCHARGE, CONTACT THE OFFICE FOR FURTHER DIRECTIONS. The patient will contact the office if the wound becomes red, has increasing pain, develops bleeding or discharge, an injury occurs, or has other concerns. The patient will continue PT consistent with total knee replacement. The patient will continue Xarelto for blood clot prevention. The patient will take OXYCODONE AND TYLENOL for pain control and titrate according to prescription and patient needs. The patient will take Colace while taking oxycodone to prevent narcotic associated constipation.  Additionally, increase water intake (drink at least 8 glasses of water daily) and try adding fiber to the diet by eating fruits, vegetables and foods that are rich in grains. If constipation is experienced, contact the medical/primary care provider to discuss further treatment options. The patient is FULL weight bearing. Elevation of the lower leg is recommended to reduce swelling. The patient will be seen in the office in 3 weeks for wound check. Staples will be removed at that time. Patient may shower after post-op day #5. The dressing is to be removed on 3/30/17. IF THE DRESSING BECOMES SOILED BEFORE THE REMOVAL DATE, CHANGE WITH A SIMILAR DRESSING. IF THE DRESSING BECOMES STAINED WITH DISCHARGE, CONTACT THE OFFICE FOR FURTHER DIRECTIONS. The patient will contact the office if the wound becomes red, has increasing pain, develops bleeding or discharge, an injury occurs, or has other concerns. The patient will continue PT consistent with total knee replacement. The patient will continue Xarelto for blood clot prevention. The patient will take OXYCODONE AND TYLENOL for pain control and titrate according to prescription and patient needs. The patient will take Colace while taking oxycodone to prevent narcotic associated constipation.  Additionally, increase water intake (drink at least 8 glasses of water daily) and try adding fiber to the diet by eating fruits, vegetables and foods that are rich in grains. If constipation is experienced, contact the medical/primary care provider to discuss further treatment options. The patient is FULL weight bearing. Elevation of the lower leg is recommended to reduce swelling.

## 2017-03-21 NOTE — PROGRESS NOTE ADULT - SUBJECTIVE AND OBJECTIVE BOX
SUBJECTIVE    REVIEW OF SYSTEMS    General: Not in any pain	    Skin/Breast: No rash  	  ENMT: No visual problems, no sore throat	    Respiratory and Thorax: No cough, No CP, No SOB  	  Cardiovascular: No CP, No palpitations    Gastrointestinal: No Abd pain, + nausea    Musculoskeletal: No Joint pain, No back pain	    Neurological: No headache    Psychiatric: No anxiety      OBJECTIVE    Vital Signs Last 24 Hrs  T(C): 36.3, Max: 36.9 (03-20 @ 17:21)  T(F): 97.4, Max: 98.4 (03-20 @ 17:21)  HR: 64 (60 - 88)  BP: 89/54 (89/54 - 166/52)  BP(mean): 79 (60 - 79)  RR: 16 (16 - 20)  SpO2: 95% (95% - 100%)    PHYSICAL EXAM:    Constitutional: Not in any distress    Eyes: No conjunctival injection    ENMT: No oral lesions    Neck: No nodes, no adenopathy    Back: Straight, no defects    Respiratory: clear b/l    Cardiovascular: RRR, no murmur    Gastrointestinal: soft, NT, ND    Extremities: No edema, no erythema    Neurological: no focal deficit    Skin: No rash      MEDICATIONS  (STANDING):  lactated ringers. 1000milliLiter(s) IV Continuous <Continuous>  acetaminophen   Tablet. 975milliGRAM(s) Oral every 8 hours  polyethylene glycol 3350 17Gram(s) Oral daily  docusate sodium 100milliGRAM(s) Oral three times a day  multivitamin 1Tablet(s) Oral daily  ondansetron Injectable 4milliGRAM(s) IV Push once  atorvastatin 20milliGRAM(s) Oral at bedtime  metoprolol 25milliGRAM(s) Oral at bedtime  metoprolol 50milliGRAM(s) Oral before breakfast  pantoprazole    Tablet 40milliGRAM(s) Oral before breakfast  rivaroxaban 10milliGRAM(s) Oral daily    MEDICATIONS  (PRN):  oxyCODONE IR 5milliGRAM(s) Oral every 4 hours PRN Mild Pain  oxyCODONE IR 10milliGRAM(s) Oral every 4 hours PRN Moderate Pain  HYDROmorphone  Injectable 0.5milliGRAM(s) IV Push every 4 hours PRN Severe Pain  aluminum hydroxide/magnesium hydroxide/simethicone Suspension 30milliLiter(s) Oral four times a day PRN Indigestion  ondansetron Injectable 4milliGRAM(s) IV Push every 6 hours PRN Nausea and/or Vomiting  magnesium hydroxide Suspension 30milliLiter(s) Oral daily PRN Constipation  senna 2Tablet(s) Oral at bedtime PRN Constipation                              10.1   8.4   )-----------( 189      ( 21 Mar 2017 06:05 )             30.4     21 Mar 2017 06:05    130    |  95     |  20.0   ----------------------------<  163    3.6     |  27.0   |  0.64     Ca    8.4        21 Mar 2017 06:05      Allergies    tobramycin (Unknown)    Intolerances

## 2017-03-21 NOTE — DISCHARGE NOTE ADULT - CARE PROVIDERS DIRECT ADDRESSES
[email or web address]

## 2017-03-21 NOTE — CONSULT NOTE ADULT - SUBJECTIVE AND OBJECTIVE BOX
Chief Complaint: Pt developed atrial fib post-op-TLKR    HPI: 70 yo F sp TLKR (prior TRKR) went into afib with  yesterday. She has a hx of a-fib that was successfully treated with RFA at [facility] 7/16. This is the first known recurrence. PMH + hpt but neg for MI/CP (had near nl cath recently),cva syncope copd/asthma (+DUSTY on c-pap) renal/hepatic/heme or thyroid disease. Past surgery-gb and AP. Nonsmoker for many years. No etoh. ROS neg for edema/orthopnea/pnd. Allergy to tobrmicin eye gtts. Old records reviewed. Hx of renal stone and SSS with permanent ddd pacer implantation.    PAST MEDICAL & SURGICAL HISTORY:  Renal stone  SSS (sick sinus syndrome)  Near syncope  Sleep apnea, unspecified type  Paroxysmal atrial fibrillation  Arthritis  Narrow angle glaucoma suspect  Obesity  Mitral regurgitation  Hyperlipidemia  Hypertension  Atrial fibrillation  H/O prior ablation treatment: cardiac  Left heart catheterization  Presence of cardiac pacemaker: [employer] Scott dual chamber  S/P anal fissurectomy  S/P cholecystectomy  S/P knee surgery: right  2013  History of appendectomy  S/P qing  H/O umbilical hernia repair  S/P tonsillectomy      PREVIOUS DIAGNOSTIC TESTING:      ECHO  FINDINGS:    STRESS  FINDINGS:    CATHETERIZATION  FINDINGS: see above    MEDICATIONS  (STANDING):  lactated ringers. 1000milliLiter(s) IV Continuous <Continuous>  acetaminophen   Tablet. 975milliGRAM(s) Oral every 8 hours  polyethylene glycol 3350 17Gram(s) Oral daily  docusate sodium 100milliGRAM(s) Oral three times a day  multivitamin 1Tablet(s) Oral daily  ondansetron Injectable 4milliGRAM(s) IV Push once  atorvastatin 20milliGRAM(s) Oral at bedtime  metoprolol 25milliGRAM(s) Oral at bedtime  metoprolol 50milliGRAM(s) Oral before breakfast  pantoprazole    Tablet 40milliGRAM(s) Oral before breakfast  rivaroxaban 10milliGRAM(s) Oral daily  ceFAZolin   IVPB 3000milliGRAM(s) IV Intermittent <User Schedule>    MEDICATIONS  (PRN):  oxyCODONE IR 5milliGRAM(s) Oral every 4 hours PRN Mild Pain  oxyCODONE IR 10milliGRAM(s) Oral every 4 hours PRN Moderate Pain  HYDROmorphone  Injectable 0.5milliGRAM(s) IV Push every 4 hours PRN Severe Pain  aluminum hydroxide/magnesium hydroxide/simethicone Suspension 30milliLiter(s) Oral four times a day PRN Indigestion  ondansetron Injectable 4milliGRAM(s) IV Push every 6 hours PRN Nausea and/or Vomiting  magnesium hydroxide Suspension 30milliLiter(s) Oral daily PRN Constipation  senna 2Tablet(s) Oral at bedtime PRN Constipation      FAMILY HISTORY:  Family history of diabetes mellitus (Father)  Family history of coronary artery disease (Father)  Family history of lung cancer (Mother)  Family history of aortic dissection (Mother)  CAD (coronary artery disease) (Father)      SOCIAL HISTORY:     CIGARETTES: 0    ALCOHOL:0    ROS: Negative other than as mentioned in HPI.    Vital Signs Last 24 Hrs  T(C): 36.3, Max: 36.9 (03-20 @ 17:21)  T(F): 97.4, Max: 98.4 (03-20 @ 17:21)  HR: 64 (60 - 88)  BP: 89/54 (89/54 - 166/52)  BP(mean): 100 right wrist manually (60 - 79)  RR: 14-non-labored (16 - 20)  SpO2: 95% (95% - 100%)    PHYSICAL EXAM:  General: Appears well developed, well nourished alert and cooperative. obese alert afebrile  HEENT: Head; normocephalic, atraumatic.  Eyes;   Pupils reactive, cornea wnl.  Neck; Supple, no nodes adenopathy, no NVD or carotid bruit or thyromegaly.  CARDIOVASCULAR; No murmur, rub, gallop or lift. Normal S1 and S2.  LUNGS; No rales, rhonchi or wheeze. Normal breath sounds bilaterally.  ABDOMEN ; Soft, nontender without mass or organomegaly. bowel sounds normoactive.  EXTREMITIES; No clubbing, cyanosis or edema.   SKIN; warm and dry with normal turgor.  NEURO; Alert/oriented x 3/normal motor exam. No pathologic reflexes.    PSYCH; normal affect.            INTERPRETATION OF TELEMETRY:    ECG: Afib with rvr and vpc's.    I&O's Detail    I & Os for current day (as of 21 Mar 2017 10:25)  =============================================  IN:    lactated ringers.: 400 ml    Total IN: 400 ml  ---------------------------------------------  OUT:    Total OUT: 0 ml  ---------------------------------------------  Total NET: 400 ml      LABS:                        10.1   8.4   )-----------( 189      ( 21 Mar 2017 06:05 )             30.4     21 Mar 2017 06:05    130    |  95     |  20.0   ----------------------------<  163    3.6     |  27.0   |  0.64     Ca    8.4        21 Mar 2017 06:05          PT/INR - ( 20 Mar 2017 11:36 )   PT: 12.0 sec;   INR: 1.09 ratio         PTT - ( 20 Mar 2017 11:36 )  PTT:31.5 sec    I&O's Summary    I & Os for current day (as of 21 Mar 2017 10:25)  =============================================  IN: 400 ml / OUT: 0 ml / NET: 400 ml      RADIOLOGY & ADDITIONAL STUDIES:

## 2017-03-21 NOTE — PHYSICAL THERAPY INITIAL EVALUATION ADULT - ADDITIONAL COMMENTS
per patient she is independent with several stairs to enter ~4. Pt states she will be able to stay on the first floor once in the home. She reports owning a cane and a RW at home.

## 2017-03-21 NOTE — PROGRESS NOTE ADULT - SUBJECTIVE AND OBJECTIVE BOX
pt pod 1 sp left TKR under spinal. Tolerated well. Denies any A/c. No B/a at this time.   pt with no n/v @ this time. using pain meds as ordered/needed with some pain relief. vss. spont vent. no issues with anesthesia at this time. pt resting comfortably @ this time.

## 2017-03-22 RX ADMIN — ATORVASTATIN CALCIUM 20 MILLIGRAM(S): 80 TABLET, FILM COATED ORAL at 21:38

## 2017-03-22 RX ADMIN — Medication 975 MILLIGRAM(S): at 05:44

## 2017-03-22 RX ADMIN — Medication 975 MILLIGRAM(S): at 11:30

## 2017-03-22 RX ADMIN — CELECOXIB 200 MILLIGRAM(S): 200 CAPSULE ORAL at 05:44

## 2017-03-22 RX ADMIN — CELECOXIB 200 MILLIGRAM(S): 200 CAPSULE ORAL at 05:58

## 2017-03-22 RX ADMIN — ONDANSETRON 4 MILLIGRAM(S): 8 TABLET, FILM COATED ORAL at 12:36

## 2017-03-22 RX ADMIN — Medication 50 MILLIGRAM(S): at 05:55

## 2017-03-22 RX ADMIN — CELECOXIB 200 MILLIGRAM(S): 200 CAPSULE ORAL at 18:14

## 2017-03-22 RX ADMIN — Medication 100 MILLIGRAM(S): at 11:30

## 2017-03-22 RX ADMIN — Medication 100 MILLIGRAM(S): at 05:44

## 2017-03-22 RX ADMIN — Medication 975 MILLIGRAM(S): at 05:58

## 2017-03-22 RX ADMIN — OXYCODONE HYDROCHLORIDE 10 MILLIGRAM(S): 5 TABLET ORAL at 05:53

## 2017-03-22 RX ADMIN — CELECOXIB 200 MILLIGRAM(S): 200 CAPSULE ORAL at 17:22

## 2017-03-22 RX ADMIN — OXYCODONE HYDROCHLORIDE 10 MILLIGRAM(S): 5 TABLET ORAL at 12:37

## 2017-03-22 RX ADMIN — OXYCODONE HYDROCHLORIDE 10 MILLIGRAM(S): 5 TABLET ORAL at 13:30

## 2017-03-22 RX ADMIN — POLYETHYLENE GLYCOL 3350 17 GRAM(S): 17 POWDER, FOR SOLUTION ORAL at 11:31

## 2017-03-22 RX ADMIN — OXYCODONE HYDROCHLORIDE 10 MILLIGRAM(S): 5 TABLET ORAL at 06:39

## 2017-03-22 RX ADMIN — PANTOPRAZOLE SODIUM 40 MILLIGRAM(S): 20 TABLET, DELAYED RELEASE ORAL at 05:44

## 2017-03-22 RX ADMIN — Medication 975 MILLIGRAM(S): at 21:38

## 2017-03-22 RX ADMIN — Medication 100 MILLIGRAM(S): at 21:38

## 2017-03-22 RX ADMIN — Medication 25 MILLIGRAM(S): at 21:38

## 2017-03-22 RX ADMIN — RIVAROXABAN 10 MILLIGRAM(S): KIT at 11:29

## 2017-03-22 RX ADMIN — Medication 975 MILLIGRAM(S): at 16:19

## 2017-03-22 NOTE — PROGRESS NOTE ADULT - SUBJECTIVE AND OBJECTIVE BOX
CHARLES FELDMAN  [id number]    History: 71y Female is status post left total knee arthroplasty on 3/22/17, POD#2. Patient is doing well and is comfortable. The patient's pain is controlled using the prescribed pain medications. The patient will  participating in physical therapy and stair training. Denies nausea, vomiting, chest pain, shortness of breath, abdominal pain or fever at present. No new complaints.                              10.1   8.4   )-----------( 189      ( 21 Mar 2017 06:05 )             30.4     21 Mar 2017 06:05    130    |  95     |  20.0   ----------------------------<  163    3.6     |  27.0   |  0.64     Ca    8.4        21 Mar 2017 06:05        MEDICATIONS  (STANDING):  lactated ringers. 1000milliLiter(s) IV Continuous <Continuous>  acetaminophen   Tablet. 975milliGRAM(s) Oral every 8 hours  celecoxib 200milliGRAM(s) Oral two times a day before meals  polyethylene glycol 3350 17Gram(s) Oral daily  docusate sodium 100milliGRAM(s) Oral three times a day  multivitamin 1Tablet(s) Oral daily  atorvastatin 20milliGRAM(s) Oral at bedtime  metoprolol 25milliGRAM(s) Oral at bedtime  metoprolol 50milliGRAM(s) Oral before breakfast  pantoprazole    Tablet 40milliGRAM(s) Oral before breakfast  rivaroxaban 10milliGRAM(s) Oral daily  promethazine IVPB 12.5milliGRAM(s) IV Intermittent once    MEDICATIONS  (PRN):  aluminum hydroxide/magnesium hydroxide/simethicone Suspension 30milliLiter(s) Oral four times a day PRN Indigestion  ondansetron Injectable 4milliGRAM(s) IV Push every 6 hours PRN Nausea and/or Vomiting  magnesium hydroxide Suspension 30milliLiter(s) Oral daily PRN Constipation  bisacodyl Suppository 10milliGRAM(s) Rectal daily PRN If no bowel movement by postoperative day #2  senna 2Tablet(s) Oral at bedtime PRN Constipation  HYDROmorphone  Injectable 0.5milliGRAM(s) IV Push every 4 hours PRN BREAKTHROUGH PAIN ONLY, if pain persists at least one hour after oral medication  oxyCODONE IR 5milliGRAM(s) Oral every 4 hours PRN Moderate Pain (4 - 6)  oxyCODONE IR 10milliGRAM(s) Oral every 4 hours PRN Severe Pain (7 - 10)      Physical exam: The left knee surgical  dressing is clean, dry and intact. No drainage or discharge. No erythema is noted. No blistering. No ecchymosis. The calf is supple nontender. Passive range of motion is acceptable to due postoperative pain. No calf tenderness  .Surgical dressings removed and new dressings placed/  Staples intact  no active drainage noted on dressings.  Honeycomb dressing placed.   Sensation to light touch is grossly intact distally. Motor function distally is 5/5. Extensor hallucis longus and flexor hallucis longus are intact. No foot drop. 2+ dorsalis pedis pulse. Capillary refill is less than 2 seconds. No cyanosis.    Primary Orthopedic Assessment:  •	s/p LEFT total knee replacement    Secondary  Orthopedic Assessment(s):   •	    Secondary  Medical Assessment(s):   •	    Plan:   •	DVT prophylaxis as prescribed, including use of compression devices and ankle pumps  •	Continue physical therapy, stair training  •	Weightbearing as tolerated of the  left t lower extremity with assistance of a walker  •	Incentive spirometry encouraged  •	Pain control as clinically indicated  •	Discharge planning – anticipated discharge is Home

## 2017-03-22 NOTE — PROGRESS NOTE ADULT - SUBJECTIVE AND OBJECTIVE BOX
SUBJECTIVE    REVIEW OF SYSTEMS    General: Not in any pain	    Skin/Breast: No rash  	  ENMT: No visual problems, no sore throat	    Respiratory and Thorax: No cough, No CP, No SOB  	  Cardiovascular: No CP, No palpitations    Gastrointestinal: No Abd pain, No N/V/D    Musculoskeletal: No Joint pain, No back pain	    Neurological: No headache    Psychiatric: No anxiety      OBJECTIVE    Vital Signs Last 24 Hrs  T(C): 36.8, Max: 37.6 (03-21 @ 23:40)  T(F): 98.3, Max: 99.6 (03-21 @ 23:40)  HR: 60 (60 - 105)  BP: 111/62 (111/62 - 143/74)  BP(mean): --  RR: 20 (18 - 20)  SpO2: 96% (96% - 100%)    PHYSICAL EXAM:    Constitutional: Not in any distress    Eyes: No conjunctival injection    ENMT: No oral lesions    Neck: No nodes, no adenopathy    Back: Straight, no defects    Respiratory: clear b/l    Cardiovascular: RRR, no murmur    Gastrointestinal: soft, NT, ND    Extremities: No edema, no erythema    Neurological: no focal deficit    Skin: No rash      MEDICATIONS  (STANDING):  lactated ringers. 1000milliLiter(s) IV Continuous <Continuous>  acetaminophen   Tablet. 975milliGRAM(s) Oral every 8 hours  celecoxib 200milliGRAM(s) Oral two times a day before meals  polyethylene glycol 3350 17Gram(s) Oral daily  docusate sodium 100milliGRAM(s) Oral three times a day  multivitamin 1Tablet(s) Oral daily  atorvastatin 20milliGRAM(s) Oral at bedtime  metoprolol 25milliGRAM(s) Oral at bedtime  metoprolol 50milliGRAM(s) Oral before breakfast  pantoprazole    Tablet 40milliGRAM(s) Oral before breakfast  rivaroxaban 10milliGRAM(s) Oral daily  promethazine IVPB 12.5milliGRAM(s) IV Intermittent once    MEDICATIONS  (PRN):  aluminum hydroxide/magnesium hydroxide/simethicone Suspension 30milliLiter(s) Oral four times a day PRN Indigestion  ondansetron Injectable 4milliGRAM(s) IV Push every 6 hours PRN Nausea and/or Vomiting  magnesium hydroxide Suspension 30milliLiter(s) Oral daily PRN Constipation  bisacodyl Suppository 10milliGRAM(s) Rectal daily PRN If no bowel movement by postoperative day #2  senna 2Tablet(s) Oral at bedtime PRN Constipation  HYDROmorphone  Injectable 0.5milliGRAM(s) IV Push every 4 hours PRN BREAKTHROUGH PAIN ONLY, if pain persists at least one hour after oral medication  oxyCODONE IR 5milliGRAM(s) Oral every 4 hours PRN Moderate Pain (4 - 6)  oxyCODONE IR 10milliGRAM(s) Oral every 4 hours PRN Severe Pain (7 - 10)                              10.1   8.4   )-----------( 189      ( 21 Mar 2017 06:05 )             30.4     21 Mar 2017 06:05    130    |  95     |  20.0   ----------------------------<  163    3.6     |  27.0   |  0.64     Ca    8.4        21 Mar 2017 06:05      Allergies    tobramycin (Unknown)    Intolerances

## 2017-03-22 NOTE — PROGRESS NOTE ADULT - SUBJECTIVE AND OBJECTIVE BOX
[facility]                                   [address] [place] [address]                                                      PHONE: [phone]                                                         FAX: [phone]  -------------------------------------------------------------------------------------------------------------------------------    Pt seen and examined. FU for AF    Overnight events/patient complaints: Pt reports nausea. Getting better.     Vital Signs Last 24 Hrs  T(C): 36.8, Max: 37.6 (03-21 @ 23:40)  T(F): 98.3, Max: 99.6 (03-21 @ 23:40)  HR: 60 (60 - 105)  BP: 111/62 (111/62 - 143/74)  BP(mean): --  RR: 20 (18 - 20)  SpO2: 96% (96% - 100%)  I&O's Summary    I & Os for current day (as of 22 Mar 2017 10:19)  =============================================  IN: 2020 ml / OUT: 400 ml / NET: 1620 ml      PHYSICAL EXAM:  Constitutional: Oriented to time, place and person. Appears well developed, well nourished; alert and co-operative  HEENT:     Conjunctiva normal, Normal oral mucosa, No JVD	  Cardiovascular: Normal S1 S2, No murmurs  Respiratory: Lungs clear to auscultation; no crepitations, no wheeze  Gastrointestinal:  Soft, Non-tender, + BS	  Extremities: No cyanosis, clubbing or edema  Skin: Warm and dry  Neurologic: Alert oriented to time place and person  Psychiatric: affect was normal        LABS:                        10.1   8.4   )-----------( 189      ( 21 Mar 2017 06:05 )             30.4     21 Mar 2017 06:05    130    |  95     |  20.0   ----------------------------<  163    3.6     |  27.0   |  0.64     Ca    8.4        21 Mar 2017 06:05          PT/INR - ( 20 Mar 2017 11:36 )   PT: 12.0 sec;   INR: 1.09 ratio         PTT - ( 20 Mar 2017 11:36 )  PTT:31.5 sec    RADIOLOGY & ADDITIONAL STUDIES:    CARDIOLOGY TESTING:     Telemetry: No arrhythmias    MEDICATIONS:  MEDICATIONS  (STANDING):  lactated ringers. 1000milliLiter(s) IV Continuous <Continuous>  acetaminophen   Tablet. 975milliGRAM(s) Oral every 8 hours  celecoxib 200milliGRAM(s) Oral two times a day before meals  polyethylene glycol 3350 17Gram(s) Oral daily  docusate sodium 100milliGRAM(s) Oral three times a day  multivitamin 1Tablet(s) Oral daily  atorvastatin 20milliGRAM(s) Oral at bedtime  metoprolol 25milliGRAM(s) Oral at bedtime  metoprolol 50milliGRAM(s) Oral before breakfast  pantoprazole    Tablet 40milliGRAM(s) Oral before breakfast  rivaroxaban 10milliGRAM(s) Oral daily  promethazine IVPB 12.5milliGRAM(s) IV Intermittent once    MEDICATIONS  (PRN):  aluminum hydroxide/magnesium hydroxide/simethicone Suspension 30milliLiter(s) Oral four times a day PRN Indigestion  ondansetron Injectable 4milliGRAM(s) IV Push every 6 hours PRN Nausea and/or Vomiting  magnesium hydroxide Suspension 30milliLiter(s) Oral daily PRN Constipation  bisacodyl Suppository 10milliGRAM(s) Rectal daily PRN If no bowel movement by postoperative day #2  senna 2Tablet(s) Oral at bedtime PRN Constipation  HYDROmorphone  Injectable 0.5milliGRAM(s) IV Push every 4 hours PRN BREAKTHROUGH PAIN ONLY, if pain persists at least one hour after oral medication  oxyCODONE IR 5milliGRAM(s) Oral every 4 hours PRN Moderate Pain (4 - 6)  oxyCODONE IR 10milliGRAM(s) Oral every 4 hours PRN Severe Pain (7 - 10)      ASSESSMENT AND PLAN:    71y Female with prior history of AF s/p ablation with post-op AF. Now in A paced rhythm without any further arrhythmias    - Continue current meds  - Resume Xarelto at outpt dose of 20 mg daily when safe from surgical standpoint  - Pls recall if any qns/concerns

## 2017-03-23 DIAGNOSIS — G47.30 SLEEP APNEA, UNSPECIFIED: ICD-10-CM

## 2017-03-23 DIAGNOSIS — R09.02 HYPOXEMIA: ICD-10-CM

## 2017-03-23 LAB
ANION GAP SERPL CALC-SCNC: 9 MMOL/L — SIGNIFICANT CHANGE UP (ref 5–17)
BUN SERPL-MCNC: 15 MG/DL — SIGNIFICANT CHANGE UP (ref 8–20)
CALCIUM SERPL-MCNC: 9.2 MG/DL — SIGNIFICANT CHANGE UP (ref 8.6–10.2)
CHLORIDE SERPL-SCNC: 99 MMOL/L — SIGNIFICANT CHANGE UP (ref 98–107)
CO2 SERPL-SCNC: 29 MMOL/L — SIGNIFICANT CHANGE UP (ref 22–29)
CREAT SERPL-MCNC: 0.74 MG/DL — SIGNIFICANT CHANGE UP (ref 0.5–1.3)
GLUCOSE SERPL-MCNC: 111 MG/DL — SIGNIFICANT CHANGE UP (ref 70–115)
HCT VFR BLD CALC: 29.1 % — LOW (ref 37–47)
HGB BLD-MCNC: 9.4 G/DL — LOW (ref 12–16)
MCHC RBC-ENTMCNC: 29.8 PG — SIGNIFICANT CHANGE UP (ref 27–31)
MCHC RBC-ENTMCNC: 32.3 G/DL — SIGNIFICANT CHANGE UP (ref 32–36)
MCV RBC AUTO: 92.4 FL — SIGNIFICANT CHANGE UP (ref 81–99)
PLATELET # BLD AUTO: 184 K/UL — SIGNIFICANT CHANGE UP (ref 150–400)
POTASSIUM SERPL-MCNC: 3.6 MMOL/L — SIGNIFICANT CHANGE UP (ref 3.5–5.3)
POTASSIUM SERPL-SCNC: 3.6 MMOL/L — SIGNIFICANT CHANGE UP (ref 3.5–5.3)
RBC # BLD: 3.15 M/UL — LOW (ref 4.4–5.2)
RBC # FLD: 13.3 % — SIGNIFICANT CHANGE UP (ref 11–15.6)
SODIUM SERPL-SCNC: 137 MMOL/L — SIGNIFICANT CHANGE UP (ref 135–145)
WBC # BLD: 6.7 K/UL — SIGNIFICANT CHANGE UP (ref 4.8–10.8)
WBC # FLD AUTO: 6.7 K/UL — SIGNIFICANT CHANGE UP (ref 4.8–10.8)

## 2017-03-23 PROCEDURE — 71010: CPT | Mod: 26

## 2017-03-23 PROCEDURE — 99222 1ST HOSP IP/OBS MODERATE 55: CPT

## 2017-03-23 RX ORDER — RIVAROXABAN 15 MG-20MG
20 KIT ORAL DAILY
Qty: 0 | Refills: 0 | Status: DISCONTINUED | OUTPATIENT
Start: 2017-03-23 | End: 2017-03-24

## 2017-03-23 RX ORDER — KETOROLAC TROMETHAMINE 30 MG/ML
15 SYRINGE (ML) INJECTION ONCE
Qty: 0 | Refills: 0 | Status: DISCONTINUED | OUTPATIENT
Start: 2017-03-23 | End: 2017-03-23

## 2017-03-23 RX ADMIN — CELECOXIB 200 MILLIGRAM(S): 200 CAPSULE ORAL at 06:05

## 2017-03-23 RX ADMIN — Medication 15 MILLIGRAM(S): at 21:15

## 2017-03-23 RX ADMIN — RIVAROXABAN 20 MILLIGRAM(S): KIT at 15:24

## 2017-03-23 RX ADMIN — Medication 975 MILLIGRAM(S): at 21:15

## 2017-03-23 RX ADMIN — Medication 100 MILLIGRAM(S): at 11:46

## 2017-03-23 RX ADMIN — Medication 975 MILLIGRAM(S): at 21:04

## 2017-03-23 RX ADMIN — Medication 100 MILLIGRAM(S): at 05:22

## 2017-03-23 RX ADMIN — Medication 15 MILLIGRAM(S): at 21:04

## 2017-03-23 RX ADMIN — CELECOXIB 200 MILLIGRAM(S): 200 CAPSULE ORAL at 05:22

## 2017-03-23 RX ADMIN — ATORVASTATIN CALCIUM 20 MILLIGRAM(S): 80 TABLET, FILM COATED ORAL at 21:04

## 2017-03-23 RX ADMIN — CELECOXIB 200 MILLIGRAM(S): 200 CAPSULE ORAL at 15:26

## 2017-03-23 RX ADMIN — Medication 50 MILLIGRAM(S): at 05:23

## 2017-03-23 RX ADMIN — POLYETHYLENE GLYCOL 3350 17 GRAM(S): 17 POWDER, FOR SOLUTION ORAL at 11:46

## 2017-03-23 RX ADMIN — OXYCODONE HYDROCHLORIDE 10 MILLIGRAM(S): 5 TABLET ORAL at 11:45

## 2017-03-23 RX ADMIN — Medication 25 MILLIGRAM(S): at 21:04

## 2017-03-23 RX ADMIN — Medication 975 MILLIGRAM(S): at 05:23

## 2017-03-23 RX ADMIN — Medication 100 MILLIGRAM(S): at 21:04

## 2017-03-23 RX ADMIN — Medication 975 MILLIGRAM(S): at 11:46

## 2017-03-23 RX ADMIN — PANTOPRAZOLE SODIUM 40 MILLIGRAM(S): 20 TABLET, DELAYED RELEASE ORAL at 05:23

## 2017-03-23 RX ADMIN — CELECOXIB 200 MILLIGRAM(S): 200 CAPSULE ORAL at 16:26

## 2017-03-23 RX ADMIN — OXYCODONE HYDROCHLORIDE 10 MILLIGRAM(S): 5 TABLET ORAL at 10:45

## 2017-03-23 RX ADMIN — Medication 975 MILLIGRAM(S): at 06:05

## 2017-03-23 RX ADMIN — Medication 975 MILLIGRAM(S): at 04:58

## 2017-03-23 RX ADMIN — Medication 975 MILLIGRAM(S): at 12:45

## 2017-03-23 RX ADMIN — ONDANSETRON 4 MILLIGRAM(S): 8 TABLET, FILM COATED ORAL at 10:48

## 2017-03-23 NOTE — CONSULT NOTE ADULT - SUBJECTIVE AND OBJECTIVE BOX
PULMONARY CONSULT NOTE      CHARLES FELDMANISMAEL-[id number]    Patient is a 71y old  Female who presents with a chief complaint of elective left total knee arthroplasty (21 Mar 2017 02:43)  H/O kevin on autopap (therapeutic = 11-13 cm H20)  Some desat noted off 02 posp op worse with sleep but not below 86% even at night & brief  No cp or dyspnea  Preop nelson WNL      HISTORY OF PRESENT ILLNESS:    MEDICATIONS  (STANDING):  lactated ringers. 1000milliLiter(s) IV Continuous <Continuous>  acetaminophen   Tablet. 975milliGRAM(s) Oral every 8 hours  celecoxib 200milliGRAM(s) Oral two times a day before meals  polyethylene glycol 3350 17Gram(s) Oral daily  docusate sodium 100milliGRAM(s) Oral three times a day  multivitamin 1Tablet(s) Oral daily  atorvastatin 20milliGRAM(s) Oral at bedtime  metoprolol 25milliGRAM(s) Oral at bedtime  metoprolol 50milliGRAM(s) Oral before breakfast  pantoprazole    Tablet 40milliGRAM(s) Oral before breakfast  rivaroxaban 10milliGRAM(s) Oral daily  promethazine IVPB 12.5milliGRAM(s) IV Intermittent once      MEDICATIONS  (PRN):  aluminum hydroxide/magnesium hydroxide/simethicone Suspension 30milliLiter(s) Oral four times a day PRN Indigestion  ondansetron Injectable 4milliGRAM(s) IV Push every 6 hours PRN Nausea and/or Vomiting  magnesium hydroxide Suspension 30milliLiter(s) Oral daily PRN Constipation  bisacodyl Suppository 10milliGRAM(s) Rectal daily PRN If no bowel movement by postoperative day #2  senna 2Tablet(s) Oral at bedtime PRN Constipation  HYDROmorphone  Injectable 0.5milliGRAM(s) IV Push every 4 hours PRN BREAKTHROUGH PAIN ONLY, if pain persists at least one hour after oral medication  oxyCODONE IR 5milliGRAM(s) Oral every 4 hours PRN Moderate Pain (4 - 6)  oxyCODONE IR 10milliGRAM(s) Oral every 4 hours PRN Severe Pain (7 - 10)      Allergies    tobramycin (Unknown)    Intolerances        PAST MEDICAL & SURGICAL HISTORY:  Renal stone  SSS (sick sinus syndrome)  Near syncope  Sleep apnea, unspecified type  Paroxysmal atrial fibrillation  Arthritis  Narrow angle glaucoma suspect  Obesity  Mitral regurgitation  Hyperlipidemia  Hypertension  Atrial fibrillation  H/O prior ablation treatment: cardiac  Left heart catheterization  Presence of cardiac pacemaker: [employer] dual chamber  S/P anal fissurectomy  S/P cholecystectomy  S/P knee surgery: right  2013  History of appendectomy  S/P qing  H/O umbilical hernia repair  S/P tonsillectomy      FAMILY HISTORY:  Family history of diabetes mellitus (Father)  Family history of coronary artery disease (Father)  Family history of lung cancer (Mother)  Family history of aortic dissection (Mother)  CAD (coronary artery disease) (Father)      SOCIAL HISTORY  Smoking History:     REVIEW OF SYSTEMS:    CONSTITUTIONAL:  No fevers, chills, sweats    HEENT:  Eyes:  No diplopia or blurred vision. ENT:  No earache, sore throat or runny nose.    CARDIOVASCULAR:  No pressure, squeezing, tightness, or heaviness about the chest; no palpitations.    RESPIRATORY:  No cough, shortness of breath, PND or orthopnea. Mild SOBOE    GASTROINTESTINAL:  No abdominal pain, nausea, vomiting or diarrhea.    GENITOURINARY:  No dysuria, frequency or urgency.    NEUROLOGIC:  No paresthesias, fasciculations, seizures or weakness.    PSYCHIATRIC:  No disorder of thought or mood.    Vital Signs Last 24 Hrs  T(C): 36.6, Max: 37.2 (03-22 @ 21:23)  T(F): 97.8, Max: 99 (03-22 @ 21:23)  HR: 70 (63 - 70)  BP: 128/66 (116/65 - 130/72)  BP(mean): --  RR: 18 (17 - 20)  SpO2: 97% (93% - 100%)    PHYSICAL EXAMINATION:    GENERAL: The patient is a well-developed, well-nourished _____in no apparent distress.     HEENT: Head is normocephalic and atraumatic. Extraocular muscles are intact. Mucous membranes are moist.     NECK: Supple.     LUNGS: Clear to auscultation without wheezing, rales, or rhonchi. Respirations unlabored    HEART: Regular rate and rhythm without murmur.    ABDOMEN: Soft, nontender, and nondistended.  No hepatosplenomegaly is noted.    EXTREMITIES: Without any cyanosis, clubbing, rash, lesions or edema.    NEUROLOGIC: Grossly intact.      LABS:                        9.4    6.7   )-----------( 184      ( 23 Mar 2017 06:17 )             29.1     23 Mar 2017 06:17    137    |  99     |  15.0   ----------------------------<  111    3.6     |  29.0   |  0.74     Ca    9.2        23 Mar 2017 06:17          RADIOLOGY & ADDITIONAL STUDIES:  PULMONARY CONSULT NOTE      SALEEM FELDMAN-[id number]    Patient is a 71y old  Female who presents with a chief complaint of elective left total knee arthroplasty (21 Mar 2017 02:43)      HISTORY OF PRESENT ILLNESS:    MEDICATIONS  (STANDING):  lactated ringers. 1000milliLiter(s) IV Continuous <Continuous>  acetaminophen   Tablet. 975milliGRAM(s) Oral every 8 hours  celecoxib 200milliGRAM(s) Oral two times a day before meals  polyethylene glycol 3350 17Gram(s) Oral daily  docusate sodium 100milliGRAM(s) Oral three times a day  multivitamin 1Tablet(s) Oral daily  atorvastatin 20milliGRAM(s) Oral at bedtime  metoprolol 25milliGRAM(s) Oral at bedtime  metoprolol 50milliGRAM(s) Oral before breakfast  pantoprazole    Tablet 40milliGRAM(s) Oral before breakfast  rivaroxaban 10milliGRAM(s) Oral daily  promethazine IVPB 12.5milliGRAM(s) IV Intermittent once      MEDICATIONS  (PRN):  aluminum hydroxide/magnesium hydroxide/simethicone Suspension 30milliLiter(s) Oral four times a day PRN Indigestion  ondansetron Injectable 4milliGRAM(s) IV Push every 6 hours PRN Nausea and/or Vomiting  magnesium hydroxide Suspension 30milliLiter(s) Oral daily PRN Constipation  bisacodyl Suppository 10milliGRAM(s) Rectal daily PRN If no bowel movement by postoperative day #2  senna 2Tablet(s) Oral at bedtime PRN Constipation  HYDROmorphone  Injectable 0.5milliGRAM(s) IV Push every 4 hours PRN BREAKTHROUGH PAIN ONLY, if pain persists at least one hour after oral medication  oxyCODONE IR 5milliGRAM(s) Oral every 4 hours PRN Moderate Pain (4 - 6)  oxyCODONE IR 10milliGRAM(s) Oral every 4 hours PRN Severe Pain (7 - 10)      Allergies    tobramycin (Unknown)    Intolerances        PAST MEDICAL & SURGICAL HISTORY:  Renal stone  SSS (sick sinus syndrome)  Near syncope  Sleep apnea, unspecified type  Paroxysmal atrial fibrillation  Arthritis  Narrow angle glaucoma suspect  Obesity  Mitral regurgitation  Hyperlipidemia  Hypertension  Atrial fibrillation  H/O prior ablation treatment: cardiac  Left heart catheterization  Presence of cardiac pacemaker: [employer] dual chamber  S/P anal fissurectomy  S/P cholecystectomy  S/P knee surgery: right  2013  History of appendectomy  S/P qing  H/O umbilical hernia repair  S/P tonsillectomy      FAMILY HISTORY:  Family history of diabetes mellitus (Father)  Family history of coronary artery disease (Father)  Family history of lung cancer (Mother)  Family history of aortic dissection (Mother)  CAD (coronary artery disease) (Father)      SOCIAL HISTORY  Smoking History:     REVIEW OF SYSTEMS:    CONSTITUTIONAL:  No fevers, chills, sweats    HEENT:  Eyes:  No diplopia or blurred vision. ENT:  No earache, sore throat or runny nose.    CARDIOVASCULAR:  No pressure, squeezing, tightness, or heaviness about the chest; no palpitations.    RESPIRATORY:  No cough, shortness of breath, PND or orthopnea. Mild SOBOE    GASTROINTESTINAL:  No abdominal pain, nausea, vomiting or diarrhea.    GENITOURINARY:  No dysuria, frequency or urgency.    NEUROLOGIC:  No paresthesias, fasciculations, seizures or weakness.    PSYCHIATRIC:  No disorder of thought or mood.    Vital Signs Last 24 Hrs  T(C): 36.6, Max: 37.2 (03-22 @ 21:23)  T(F): 97.8, Max: 99 (03-22 @ 21:23)  HR: 70 (63 - 70)  BP: 128/66 (116/65 - 130/72)  BP(mean): --  RR: 18 (17 - 20)  SpO2: 97% (93% - 100%)    PHYSICAL EXAMINATION:    GENERAL: The patient is a well-developed, well-nourished _____in no apparent distress.     HEENT: Head is normocephalic and atraumatic. Extraocular muscles are intact. Mucous membranes are moist.     NECK: Supple.     LUNGS: Clear to auscultation without wheezing, rales, or rhonchi. Respirations unlabored    HEART: Regular rate and rhythm without murmur.    ABDOMEN: Soft, nontender, and nondistended.  No hepatosplenomegaly is noted.    EXTREMITIES: Without any cyanosis, clubbing, rash, lesions or edema.    NEUROLOGIC: Grossly intact.      LABS:                        9.4    6.7   )-----------( 184      ( 23 Mar 2017 06:17 )             29.1     23 Mar 2017 06:17    137    |  99     |  15.0   ----------------------------<  111    3.6     |  29.0   |  0.74     Ca    9.2        23 Mar 2017 06:17          RADIOLOGY & ADDITIONAL STUDIES:  No recent CXR

## 2017-03-23 NOTE — PROGRESS NOTE ADULT - SUBJECTIVE AND OBJECTIVE BOX
Patient seen and examined at bedside. Pain controlled. Denies fever/chills, SOB/chest pain, abdominal pain, numbness/tingling. No complaints.    Vital Signs Last 24 Hrs  T(C): 36.6, Max: 37.2 (03-22 @ 21:23)  T(F): 97.8, Max: 99 (03-22 @ 21:23)  HR: 70 (63 - 70)  BP: 128/66 (116/65 - 130/72)  BP(mean): --  RR: 18 (17 - 20)  SpO2: 97% (93% - 100%)    LLE: small area of serosanguinous staining to dressing. dressing changed. Incision healing well, staples in tact. no erythema, no active drainage. new dressing applied. Sensation in tact to light touch. + dorsi/plantarflexion. ext warm. calf soft NT B/L.                          9.4    6.7   )-----------( 184      ( 23 Mar 2017 06:17 )             29.1     A/P: 71 y.o F s/p left TKA POD #3  - WBAT  - DVTP  - d/c planning - d/c home tomorrow when cleared by PT/medicine as per dr alexandre

## 2017-03-23 NOTE — PROGRESS NOTE ADULT - SUBJECTIVE AND OBJECTIVE BOX
SUBJECTIVE    REVIEW OF SYSTEMS    General: Not in any pain	    Skin/Breast: No rash  	  ENMT: No visual problems, no sore throat	    Respiratory and Thorax: No cough, No CP, No SOB  	  Cardiovascular: No CP, No palpitations    Gastrointestinal: No Abd pain, No N/V/D    Musculoskeletal: No Joint pain, No back pain	    Neurological: No headache    Psychiatric: No anxiety      OBJECTIVE    Vital Signs Last 24 Hrs  T(C): 36.6, Max: 37.2 (03-22 @ 21:23)  T(F): 97.8, Max: 99 (03-22 @ 21:23)  HR: 70 (63 - 70)  BP: 116/53 (116/53 - 130/72)  BP(mean): --  RR: 18 (17 - 20)  SpO2: 97% (93% - 100%)    PHYSICAL EXAM:    Constitutional: Not in any distress    Eyes: No conjunctival injection    ENMT: No oral lesions    Neck: No nodes, no adenopathy    Back: Straight, no defects    Respiratory: clear b/l    Cardiovascular: RRR, no murmur    Gastrointestinal: soft, NT, ND    Extremities: No edema, no erythema    Neurological: no focal deficit    Skin: No rash      MEDICATIONS  (STANDING):  lactated ringers. 1000milliLiter(s) IV Continuous <Continuous>  acetaminophen   Tablet. 975milliGRAM(s) Oral every 8 hours  celecoxib 200milliGRAM(s) Oral two times a day before meals  polyethylene glycol 3350 17Gram(s) Oral daily  docusate sodium 100milliGRAM(s) Oral three times a day  multivitamin 1Tablet(s) Oral daily  atorvastatin 20milliGRAM(s) Oral at bedtime  metoprolol 25milliGRAM(s) Oral at bedtime  metoprolol 50milliGRAM(s) Oral before breakfast  pantoprazole    Tablet 40milliGRAM(s) Oral before breakfast  promethazine IVPB 12.5milliGRAM(s) IV Intermittent once  rivaroxaban 20milliGRAM(s) Oral daily    MEDICATIONS  (PRN):  aluminum hydroxide/magnesium hydroxide/simethicone Suspension 30milliLiter(s) Oral four times a day PRN Indigestion  ondansetron Injectable 4milliGRAM(s) IV Push every 6 hours PRN Nausea and/or Vomiting  magnesium hydroxide Suspension 30milliLiter(s) Oral daily PRN Constipation  bisacodyl Suppository 10milliGRAM(s) Rectal daily PRN If no bowel movement by postoperative day #2  senna 2Tablet(s) Oral at bedtime PRN Constipation  HYDROmorphone  Injectable 0.5milliGRAM(s) IV Push every 4 hours PRN BREAKTHROUGH PAIN ONLY, if pain persists at least one hour after oral medication  oxyCODONE IR 5milliGRAM(s) Oral every 4 hours PRN Moderate Pain (4 - 6)  oxyCODONE IR 10milliGRAM(s) Oral every 4 hours PRN Severe Pain (7 - 10)                              9.4    6.7   )-----------( 184      ( 23 Mar 2017 06:17 )             29.1     23 Mar 2017 06:17    137    |  99     |  15.0   ----------------------------<  111    3.6     |  29.0   |  0.74     Ca    9.2        23 Mar 2017 06:17      Allergies    tobramycin (Unknown)    Intolerances

## 2017-03-24 VITALS
OXYGEN SATURATION: 96 % | DIASTOLIC BLOOD PRESSURE: 70 MMHG | RESPIRATION RATE: 20 BRPM | SYSTOLIC BLOOD PRESSURE: 122 MMHG | TEMPERATURE: 98 F | HEART RATE: 63 BPM

## 2017-03-24 DIAGNOSIS — E66.9 OBESITY, UNSPECIFIED: ICD-10-CM

## 2017-03-24 DIAGNOSIS — Z29.9 ENCOUNTER FOR PROPHYLACTIC MEASURES, UNSPECIFIED: ICD-10-CM

## 2017-03-24 PROCEDURE — C1776: CPT

## 2017-03-24 PROCEDURE — 97530 THERAPEUTIC ACTIVITIES: CPT

## 2017-03-24 PROCEDURE — 86850 RBC ANTIBODY SCREEN: CPT

## 2017-03-24 PROCEDURE — 86900 BLOOD TYPING SEROLOGIC ABO: CPT

## 2017-03-24 PROCEDURE — 85730 THROMBOPLASTIN TIME PARTIAL: CPT

## 2017-03-24 PROCEDURE — 97116 GAIT TRAINING THERAPY: CPT

## 2017-03-24 PROCEDURE — 85027 COMPLETE CBC AUTOMATED: CPT

## 2017-03-24 PROCEDURE — 88311 DECALCIFY TISSUE: CPT

## 2017-03-24 PROCEDURE — C1713: CPT

## 2017-03-24 PROCEDURE — 97110 THERAPEUTIC EXERCISES: CPT

## 2017-03-24 PROCEDURE — 36415 COLL VENOUS BLD VENIPUNCTURE: CPT

## 2017-03-24 PROCEDURE — 93005 ELECTROCARDIOGRAM TRACING: CPT

## 2017-03-24 PROCEDURE — 73560 X-RAY EXAM OF KNEE 1 OR 2: CPT

## 2017-03-24 PROCEDURE — 88305 TISSUE EXAM BY PATHOLOGIST: CPT

## 2017-03-24 PROCEDURE — 97163 PT EVAL HIGH COMPLEX 45 MIN: CPT

## 2017-03-24 PROCEDURE — 71045 X-RAY EXAM CHEST 1 VIEW: CPT

## 2017-03-24 PROCEDURE — 86901 BLOOD TYPING SEROLOGIC RH(D): CPT

## 2017-03-24 PROCEDURE — 80048 BASIC METABOLIC PNL TOTAL CA: CPT

## 2017-03-24 PROCEDURE — 85610 PROTHROMBIN TIME: CPT

## 2017-03-24 PROCEDURE — 99232 SBSQ HOSP IP/OBS MODERATE 35: CPT

## 2017-03-24 RX ORDER — RIVAROXABAN 15 MG-20MG
1 KIT ORAL
Qty: 21 | Refills: 0
Start: 2017-03-24

## 2017-03-24 RX ORDER — DOCUSATE SODIUM 100 MG
1 CAPSULE ORAL
Qty: 60 | Refills: 0
Start: 2017-03-24

## 2017-03-24 RX ORDER — OXYCODONE HYDROCHLORIDE 5 MG/1
1 TABLET ORAL
Qty: 55 | Refills: 0
Start: 2017-03-24

## 2017-03-24 RX ADMIN — CELECOXIB 200 MILLIGRAM(S): 200 CAPSULE ORAL at 05:45

## 2017-03-24 RX ADMIN — Medication 100 MILLIGRAM(S): at 05:45

## 2017-03-24 RX ADMIN — PANTOPRAZOLE SODIUM 40 MILLIGRAM(S): 20 TABLET, DELAYED RELEASE ORAL at 05:44

## 2017-03-24 RX ADMIN — Medication 975 MILLIGRAM(S): at 05:51

## 2017-03-24 RX ADMIN — Medication 1 TABLET(S): at 12:26

## 2017-03-24 RX ADMIN — OXYCODONE HYDROCHLORIDE 10 MILLIGRAM(S): 5 TABLET ORAL at 13:59

## 2017-03-24 RX ADMIN — POLYETHYLENE GLYCOL 3350 17 GRAM(S): 17 POWDER, FOR SOLUTION ORAL at 12:26

## 2017-03-24 RX ADMIN — Medication 975 MILLIGRAM(S): at 05:44

## 2017-03-24 RX ADMIN — OXYCODONE HYDROCHLORIDE 10 MILLIGRAM(S): 5 TABLET ORAL at 15:00

## 2017-03-24 RX ADMIN — Medication 100 MILLIGRAM(S): at 12:26

## 2017-03-24 RX ADMIN — CELECOXIB 200 MILLIGRAM(S): 200 CAPSULE ORAL at 05:50

## 2017-03-24 RX ADMIN — Medication 50 MILLIGRAM(S): at 05:45

## 2017-03-24 RX ADMIN — RIVAROXABAN 20 MILLIGRAM(S): KIT at 12:26

## 2017-03-24 NOTE — PROGRESS NOTE ADULT - PROBLEM SELECTOR PLAN 1
Continue autoPAP
cont xeralto - incr to 20 mg/day
hx of afib; supposedly had episode in recovery room yesterday
on Xarelto
1-Patient is stable, pain is controlled  2-Continue current medication regimen as ordered  3-Will sign off

## 2017-03-24 NOTE — PROGRESS NOTE ADULT - PROBLEM SELECTOR PROBLEM 1
Paroxysmal atrial fibrillation
Sleep apnea, unspecified type
Primary osteoarthritis of left knee

## 2017-03-24 NOTE — PROGRESS NOTE ADULT - PROBLEM SELECTOR PLAN 2
Supplemental O2 as needed  Evaluate for home oxygen  Consider outpt nocturnal oximetry  Incentive spirometry

## 2017-03-24 NOTE — PROGRESS NOTE ADULT - SUBJECTIVE AND OBJECTIVE BOX
PULMONARY PROGRESS NOTE      CHARLES FELDMAN  MRN-[id number]    Patient is a 71y old  Female who presents with a chief complaint of elective left total knee arthroplasty (21 Mar 2017 02:43)      INTERVAL HPI/OVERNIGHT EVENTS:    Patient is awake and alert  Tolerating own nocturnal autoPAP  No respiratory complaints     MEDICATIONS  (STANDING):  acetaminophen   Tablet. 975milliGRAM(s) Oral every 8 hours  celecoxib 200milliGRAM(s) Oral two times a day before meals  polyethylene glycol 3350 17Gram(s) Oral daily  docusate sodium 100milliGRAM(s) Oral three times a day  multivitamin 1Tablet(s) Oral daily  atorvastatin 20milliGRAM(s) Oral at bedtime  metoprolol 25milliGRAM(s) Oral at bedtime  metoprolol 50milliGRAM(s) Oral before breakfast  pantoprazole    Tablet 40milliGRAM(s) Oral before breakfast  promethazine IVPB 12.5milliGRAM(s) IV Intermittent once  rivaroxaban 20milliGRAM(s) Oral daily      MEDICATIONS  (PRN):  aluminum hydroxide/magnesium hydroxide/simethicone Suspension 30milliLiter(s) Oral four times a day PRN Indigestion  ondansetron Injectable 4milliGRAM(s) IV Push every 6 hours PRN Nausea and/or Vomiting  magnesium hydroxide Suspension 30milliLiter(s) Oral daily PRN Constipation  bisacodyl Suppository 10milliGRAM(s) Rectal daily PRN If no bowel movement by postoperative day #2  senna 2Tablet(s) Oral at bedtime PRN Constipation  HYDROmorphone  Injectable 0.5milliGRAM(s) IV Push every 4 hours PRN BREAKTHROUGH PAIN ONLY, if pain persists at least one hour after oral medication  oxyCODONE IR 5milliGRAM(s) Oral every 4 hours PRN Moderate Pain (4 - 6)  oxyCODONE IR 10milliGRAM(s) Oral every 4 hours PRN Severe Pain (7 - 10)      Allergies    tobramycin (Unknown)    Intolerances        PAST MEDICAL & SURGICAL HISTORY:  Renal stone  SSS (sick sinus syndrome)  Near syncope  Sleep apnea, unspecified type  Paroxysmal atrial fibrillation  Arthritis  Narrow angle glaucoma suspect  Obesity  Mitral regurgitation  Hyperlipidemia  Hypertension  Atrial fibrillation  H/O prior ablation treatment: cardiac  Left heart catheterization  Presence of cardiac pacemaker: [employer] dual chamber  S/P anal fissurectomy  S/P cholecystectomy  S/P knee surgery: right  2013  History of appendectomy  S/P qing  H/O umbilical hernia repair  S/P tonsillectomy        REVIEW OF SYSTEMS:    CONSTITUTIONAL:  No distress    HEENT:  Eyes:  No diplopia or blurred vision. ENT:  No earache, sore throat or runny nose.    CARDIOVASCULAR:  No pressure, squeezing, tightness, heaviness or aching about the chest; no palpitations.    RESPIRATORY:  No cough, shortness of breath, PND or orthopnea. Mild SOBOE    GASTROINTESTINAL:  No nausea, vomiting or diarrhea.    GENITOURINARY:  No dysuria, frequency or urgency.    NEUROLOGIC:  No paresthesias, fasciculations, seizures or weakness.    PSYCHIATRIC:  No disorder of thought or mood.    Vital Signs Last 24 Hrs  T(C): 36.8, Max: 36.8 (03-24 @ 00:25)  T(F): 98.2, Max: 98.3 (03-24 @ 00:25)  HR: 63 (61 - 77)  BP: 122/70 (119/60 - 140/68)  BP(mean): --  RR: 20 (18 - 20)  SpO2: 96% (95% - 97%)    PHYSICAL EXAMINATION:    GENERAL: The patient is awake and alert in no apparent distress.     HEENT: Head is normocephalic and atraumatic. Extraocular muscles are intact. Mucous membranes are moist.    NECK: Supple.    LUNGS: Clear to auscultation without wheezing, rales or rhonchi; respirations unlabored    HEART: Regular rate and rhythm without murmur.    ABDOMEN: Soft, nontender, and nondistended.      EXTREMITIES: Without any cyanosis, clubbing, rash, lesions with mild edema.    NEUROLOGIC: Grossly intact.    LABS:                        9.4    6.7   )-----------( 184      ( 23 Mar 2017 06:17 )             29.1     23 Mar 2017 06:17    137    |  99     |  15.0   ----------------------------<  111    3.6     |  29.0   |  0.74     Ca    9.2        23 Mar 2017 06:17      RADIOLOGY & ADDITIONAL STUDIES:    EXAM:  CHEST SINGLE VIEW FRONTAL                          PROCEDURE DATE:  03/23/2017        INTERPRETATION:  Portable chest radiograph        CLINICAL INFORMATION:   Abnormal chest sounds. Hypoxia.    TECHNIQUE:  Portable  AP view of the chest was obtained.    COMPARISON: 8/7/2013 chest available for review.    FINDINGS:   The lungs  are clear.  No pleural abnormality is seen.         The  heart is enlarged in transverse diameter. No hilar mass. Trachea   midline.        Visualized osseous structures are intact.    IMPRESSION:   No evidence of active chest disease.   Cardiomegaly    EVELIA GUADALUPE M.D., ATTENDING RADIOLOGIST  This document has been electronically signed. Mar 23 2017 11:19AM

## 2017-03-24 NOTE — PROGRESS NOTE ADULT - SUBJECTIVE AND OBJECTIVE BOX
Ortho Post Op Check    Name: CHARLES FELDMAN    MR #: [id number]    Procedure: Left total knee arthroplasty   Surgeon: Dr Mc    Pt comfortable without complaints, pain controlled  Denies CP, SOB, N/V, numbness/tingling     General Exam:  Vital Signs Last 24 Hrs  T(C): 36.7, Max: 36.7 (03-24 @ 05:43)  T(F): 98.1, Max: 98.1 (03-24 @ 05:43)  HR: 64 (64 - 64)  BP: 135/78 (135/78 - 135/78)  BP(mean): --  RR: 18 (18 - 18)  SpO2: 97% (97% - 97%)  Wt(kg): --    General: Pt Alert and oriented, NAD, controlled pain.  Dressings removed to reveal incision C/D/I. New honeycomb dressing applied. Dressings on calf removed to reveal blisters unroofed. No active bleeding or drainage noted. New dry dressings applied No bleeding.  Pulses: 2+ dorsalis pedis pulse. Cap refill < 2 sec.  Sensation: Grossly intact to light touch without deficit.  Motor: + EHL/FHL/TA/GS                          9.4    6.7   )-----------( 184      ( 23 Mar 2017 06:17 )             29.1   23 Mar 2017 06:17    137    |  99     |  15.0   ----------------------------<  111    3.6     |  29.0   |  0.74           A/P: 71yFemale POD#4 s/p Left TKA  - Stable  - Pain Control  - DVT ppx: Xarelto  - Weight bearing status: WBAT  - DC to home today

## 2017-03-24 NOTE — PROGRESS NOTE ADULT - PROBLEM SELECTOR PLAN 4
On Xarelto/PPI  Patient for d/c home today with pulmonary outpt f/u  Little else to add - Recall prn

## 2017-03-27 LAB — SURGICAL PATHOLOGY FINAL REPORT - CH: SIGNIFICANT CHANGE UP

## 2017-04-12 ENCOUNTER — APPOINTMENT (OUTPATIENT)
Dept: ORTHOPEDIC SURGERY | Facility: CLINIC | Age: 72
End: 2017-04-12

## 2017-04-13 ENCOUNTER — OUTPATIENT (OUTPATIENT)
Dept: OUTPATIENT SERVICES | Facility: HOSPITAL | Age: 72
LOS: 1 days | End: 2017-04-13
Payer: MEDICARE

## 2017-04-13 DIAGNOSIS — Z98.89 OTHER SPECIFIED POSTPROCEDURAL STATES: Chronic | ICD-10-CM

## 2017-04-13 DIAGNOSIS — Z98.890 OTHER SPECIFIED POSTPROCEDURAL STATES: Chronic | ICD-10-CM

## 2017-04-13 DIAGNOSIS — Z95.0 PRESENCE OF CARDIAC PACEMAKER: Chronic | ICD-10-CM

## 2017-04-13 DIAGNOSIS — Z90.49 ACQUIRED ABSENCE OF OTHER SPECIFIED PARTS OF DIGESTIVE TRACT: Chronic | ICD-10-CM

## 2017-04-13 DIAGNOSIS — Z51.89 ENCOUNTER FOR OTHER SPECIFIED AFTERCARE: ICD-10-CM

## 2017-04-13 DIAGNOSIS — M17.11 UNILATERAL PRIMARY OSTEOARTHRITIS, RIGHT KNEE: ICD-10-CM

## 2017-05-10 ENCOUNTER — APPOINTMENT (OUTPATIENT)
Dept: ORTHOPEDIC SURGERY | Facility: CLINIC | Age: 72
End: 2017-05-10

## 2017-06-16 PROCEDURE — 97140 MANUAL THERAPY 1/> REGIONS: CPT

## 2017-06-16 PROCEDURE — G8978: CPT | Mod: CK

## 2017-06-16 PROCEDURE — 97110 THERAPEUTIC EXERCISES: CPT

## 2017-06-16 PROCEDURE — G8979: CPT | Mod: CJ

## 2017-06-16 PROCEDURE — 97010 HOT OR COLD PACKS THERAPY: CPT

## 2017-06-16 PROCEDURE — 97116 GAIT TRAINING THERAPY: CPT

## 2017-06-16 PROCEDURE — 97163 PT EVAL HIGH COMPLEX 45 MIN: CPT

## 2017-07-12 ENCOUNTER — APPOINTMENT (OUTPATIENT)
Dept: ORTHOPEDIC SURGERY | Facility: CLINIC | Age: 72
End: 2017-07-12

## 2017-07-12 VITALS
SYSTOLIC BLOOD PRESSURE: 133 MMHG | BODY MASS INDEX: 46.95 KG/M2 | TEMPERATURE: 99.7 F | HEART RATE: 71 BPM | DIASTOLIC BLOOD PRESSURE: 69 MMHG | HEIGHT: 64 IN | WEIGHT: 275 LBS

## 2017-07-12 RX ORDER — ERYTHROMYCIN 5 MG/G
5 OINTMENT OPHTHALMIC
Qty: 12 | Refills: 0 | Status: COMPLETED | COMMUNITY
Start: 2017-02-16

## 2017-07-12 RX ORDER — MUPIROCIN 20 MG/G
2 OINTMENT TOPICAL
Qty: 22 | Refills: 0 | Status: COMPLETED | COMMUNITY
Start: 2017-03-07

## 2017-07-12 RX ORDER — OXYCODONE 5 MG/1
5 TABLET ORAL
Qty: 84 | Refills: 0 | Status: COMPLETED | COMMUNITY
Start: 2017-04-03

## 2017-08-24 ENCOUNTER — OTHER (OUTPATIENT)
Age: 72
End: 2017-08-24

## 2017-09-03 ENCOUNTER — TRANSCRIPTION ENCOUNTER (OUTPATIENT)
Age: 72
End: 2017-09-03

## 2017-09-11 ENCOUNTER — APPOINTMENT (OUTPATIENT)
Dept: DERMATOLOGY | Facility: CLINIC | Age: 72
End: 2017-09-11
Payer: MEDICARE

## 2017-09-11 PROCEDURE — 99213 OFFICE O/P EST LOW 20 MIN: CPT

## 2017-11-06 ENCOUNTER — APPOINTMENT (OUTPATIENT)
Dept: ORTHOPEDIC SURGERY | Facility: CLINIC | Age: 72
End: 2017-11-06
Payer: MEDICARE

## 2017-11-06 ENCOUNTER — OTHER (OUTPATIENT)
Age: 72
End: 2017-11-06

## 2017-11-06 VITALS — HEIGHT: 64 IN | WEIGHT: 129 LBS | BODY MASS INDEX: 22.02 KG/M2

## 2017-11-06 VITALS
DIASTOLIC BLOOD PRESSURE: 80 MMHG | SYSTOLIC BLOOD PRESSURE: 130 MMHG | TEMPERATURE: 99 F | RESPIRATION RATE: 15 BRPM | HEART RATE: 80 BPM

## 2017-11-06 DIAGNOSIS — L03.119 CELLULITIS OF UNSPECIFIED PART OF LIMB: ICD-10-CM

## 2017-11-06 PROCEDURE — 99214 OFFICE O/P EST MOD 30 MIN: CPT | Mod: 25

## 2017-11-06 PROCEDURE — 20610 DRAIN/INJ JOINT/BURSA W/O US: CPT | Mod: LT

## 2017-11-08 ENCOUNTER — APPOINTMENT (OUTPATIENT)
Dept: ORTHOPEDIC SURGERY | Facility: CLINIC | Age: 72
End: 2017-11-08
Payer: MEDICARE

## 2017-11-08 VITALS
WEIGHT: 249 LBS | HEART RATE: 64 BPM | HEIGHT: 64 IN | BODY MASS INDEX: 42.51 KG/M2 | TEMPERATURE: 98.1 F | DIASTOLIC BLOOD PRESSURE: 75 MMHG | SYSTOLIC BLOOD PRESSURE: 129 MMHG

## 2017-11-08 PROCEDURE — 99214 OFFICE O/P EST MOD 30 MIN: CPT | Mod: 25

## 2017-11-08 PROCEDURE — 73562 X-RAY EXAM OF KNEE 3: CPT | Mod: LT

## 2017-11-08 PROCEDURE — 20610 DRAIN/INJ JOINT/BURSA W/O US: CPT | Mod: LT

## 2017-11-09 ENCOUNTER — OTHER (OUTPATIENT)
Age: 72
End: 2017-11-09

## 2017-11-10 LAB
B PERT IGG+IGM PNL SER: ABNORMAL
COLOR FLD: NORMAL
CRYSTALS SNV QL MICRO: NORMAL
FLUID INTAKE SUBSTANCE CLASS: NORMAL
LYMPHOCYTES # FLD MANUAL: 19 %
MONOS+MACROS NFR FLD MANUAL: 69 %
NEUTS SEG # FLD MANUAL: 12 %
RBC # FLD MANUAL: ABNORMAL /UL
TOTAL CELLS COUNTED FLD: 386 /UL
TUBE TYPE: NORMAL

## 2017-11-15 ENCOUNTER — APPOINTMENT (OUTPATIENT)
Dept: ORTHOPEDIC SURGERY | Facility: CLINIC | Age: 72
End: 2017-11-15
Payer: MEDICARE

## 2017-11-15 VITALS
HEIGHT: 64 IN | SYSTOLIC BLOOD PRESSURE: 128 MMHG | HEART RATE: 69 BPM | TEMPERATURE: 98.3 F | DIASTOLIC BLOOD PRESSURE: 71 MMHG | WEIGHT: 249 LBS | BODY MASS INDEX: 42.51 KG/M2

## 2017-11-15 PROCEDURE — 99214 OFFICE O/P EST MOD 30 MIN: CPT

## 2017-11-20 ENCOUNTER — APPOINTMENT (OUTPATIENT)
Dept: ORTHOPEDIC SURGERY | Facility: CLINIC | Age: 72
End: 2017-11-20
Payer: MEDICARE

## 2017-11-20 PROCEDURE — 99213 OFFICE O/P EST LOW 20 MIN: CPT

## 2017-11-29 ENCOUNTER — APPOINTMENT (OUTPATIENT)
Dept: ORTHOPEDIC SURGERY | Facility: CLINIC | Age: 72
End: 2017-11-29
Payer: MEDICARE

## 2017-11-29 VITALS
SYSTOLIC BLOOD PRESSURE: 131 MMHG | TEMPERATURE: 98.7 F | WEIGHT: 250 LBS | HEIGHT: 64 IN | HEART RATE: 59 BPM | DIASTOLIC BLOOD PRESSURE: 72 MMHG | BODY MASS INDEX: 42.68 KG/M2

## 2017-11-29 PROCEDURE — 99213 OFFICE O/P EST LOW 20 MIN: CPT

## 2017-12-01 ENCOUNTER — APPOINTMENT (OUTPATIENT)
Dept: OBGYN | Facility: CLINIC | Age: 72
End: 2017-12-01
Payer: MEDICARE

## 2017-12-01 VITALS
HEIGHT: 64 IN | SYSTOLIC BLOOD PRESSURE: 136 MMHG | BODY MASS INDEX: 42.68 KG/M2 | WEIGHT: 250 LBS | DIASTOLIC BLOOD PRESSURE: 78 MMHG

## 2017-12-01 DIAGNOSIS — Z82.49 FAMILY HISTORY OF ISCHEMIC HEART DISEASE AND OTHER DISEASES OF THE CIRCULATORY SYSTEM: ICD-10-CM

## 2017-12-01 DIAGNOSIS — Z80.1 FAMILY HISTORY OF MALIGNANT NEOPLASM OF TRACHEA, BRONCHUS AND LUNG: ICD-10-CM

## 2017-12-01 DIAGNOSIS — Z87.01 PERSONAL HISTORY OF PNEUMONIA (RECURRENT): ICD-10-CM

## 2017-12-01 DIAGNOSIS — Z87.891 PERSONAL HISTORY OF NICOTINE DEPENDENCE: ICD-10-CM

## 2017-12-01 DIAGNOSIS — Z78.0 ASYMPTOMATIC MENOPAUSAL STATE: ICD-10-CM

## 2017-12-01 DIAGNOSIS — Z82.5 FAMILY HISTORY OF ASTHMA AND OTHER CHRONIC LOWER RESPIRATORY DISEASES: ICD-10-CM

## 2017-12-01 DIAGNOSIS — Z86.79 PERSONAL HISTORY OF OTHER DISEASES OF THE CIRCULATORY SYSTEM: ICD-10-CM

## 2017-12-01 DIAGNOSIS — Z83.3 FAMILY HISTORY OF DIABETES MELLITUS: ICD-10-CM

## 2017-12-01 LAB — BACTERIA FLD CULT: NORMAL

## 2017-12-01 PROCEDURE — G0101: CPT

## 2017-12-04 ENCOUNTER — APPOINTMENT (OUTPATIENT)
Dept: PULMONOLOGY | Facility: CLINIC | Age: 72
End: 2017-12-04
Payer: MEDICARE

## 2017-12-04 VITALS
BODY MASS INDEX: 44.29 KG/M2 | DIASTOLIC BLOOD PRESSURE: 80 MMHG | SYSTOLIC BLOOD PRESSURE: 124 MMHG | HEART RATE: 60 BPM | RESPIRATION RATE: 14 BRPM | OXYGEN SATURATION: 98 % | WEIGHT: 258 LBS

## 2017-12-04 PROCEDURE — 99214 OFFICE O/P EST MOD 30 MIN: CPT

## 2017-12-04 RX ORDER — CEPHALEXIN 250 MG/1
250 CAPSULE ORAL 4 TIMES DAILY
Qty: 16 | Refills: 0 | Status: DISCONTINUED | COMMUNITY
Start: 2017-11-08 | End: 2017-12-04

## 2017-12-04 RX ORDER — PHENAZOPYRIDINE HYDROCHLORIDE 200 MG/1
200 TABLET ORAL
Qty: 9 | Refills: 0 | Status: DISCONTINUED | COMMUNITY
Start: 2017-07-25 | End: 2017-12-04

## 2017-12-04 RX ORDER — CEPHALEXIN 250 MG/1
250 CAPSULE ORAL 4 TIMES DAILY
Qty: 16 | Refills: 0 | Status: DISCONTINUED | COMMUNITY
Start: 2017-11-09 | End: 2017-12-04

## 2017-12-04 RX ORDER — AMOXICILLIN 500 MG/1
500 CAPSULE ORAL
Qty: 20 | Refills: 0 | Status: DISCONTINUED | COMMUNITY
Start: 2017-07-08 | End: 2017-12-04

## 2017-12-04 RX ORDER — CEPHALEXIN 500 MG/1
500 CAPSULE ORAL 4 TIMES DAILY
Qty: 40 | Refills: 0 | Status: DISCONTINUED | COMMUNITY
Start: 2017-11-06 | End: 2017-12-04

## 2017-12-04 RX ORDER — CEPHALEXIN 500 MG/1
500 TABLET ORAL EVERY 6 HOURS
Qty: 40 | Refills: 0 | Status: DISCONTINUED | COMMUNITY
Start: 2017-11-20 | End: 2017-12-04

## 2017-12-04 RX ORDER — CEFADROXIL 500 MG/1
500 CAPSULE ORAL
Qty: 14 | Refills: 0 | Status: DISCONTINUED | COMMUNITY
Start: 2017-07-25 | End: 2017-12-04

## 2017-12-04 RX ORDER — OXYCODONE AND ACETAMINOPHEN 10; 325 MG/1; MG/1
10-325 TABLET ORAL
Qty: 20 | Refills: 0 | Status: DISCONTINUED | COMMUNITY
Start: 2017-07-05 | End: 2017-12-04

## 2017-12-04 RX ORDER — CEPHALEXIN 500 MG/1
500 CAPSULE ORAL 4 TIMES DAILY
Qty: 28 | Refills: 0 | Status: DISCONTINUED | COMMUNITY
Start: 2017-11-15 | End: 2017-12-04

## 2017-12-06 ENCOUNTER — APPOINTMENT (OUTPATIENT)
Dept: ORTHOPEDIC SURGERY | Facility: CLINIC | Age: 72
End: 2017-12-06
Payer: MEDICARE

## 2017-12-06 VITALS — TEMPERATURE: 98.8 F | BODY MASS INDEX: 43.54 KG/M2 | HEIGHT: 64 IN | WEIGHT: 255 LBS

## 2017-12-06 PROCEDURE — 99213 OFFICE O/P EST LOW 20 MIN: CPT

## 2017-12-20 ENCOUNTER — APPOINTMENT (OUTPATIENT)
Dept: ORTHOPEDIC SURGERY | Facility: CLINIC | Age: 72
End: 2017-12-20
Payer: MEDICARE

## 2017-12-20 VITALS
WEIGHT: 255 LBS | DIASTOLIC BLOOD PRESSURE: 70 MMHG | BODY MASS INDEX: 43.54 KG/M2 | HEIGHT: 64 IN | HEART RATE: 60 BPM | TEMPERATURE: 98.3 F | SYSTOLIC BLOOD PRESSURE: 132 MMHG

## 2017-12-20 PROCEDURE — 99213 OFFICE O/P EST LOW 20 MIN: CPT

## 2018-01-17 ENCOUNTER — APPOINTMENT (OUTPATIENT)
Dept: ORTHOPEDIC SURGERY | Facility: CLINIC | Age: 73
End: 2018-01-17
Payer: MEDICARE

## 2018-01-17 VITALS
DIASTOLIC BLOOD PRESSURE: 67 MMHG | SYSTOLIC BLOOD PRESSURE: 129 MMHG | TEMPERATURE: 98.3 F | HEIGHT: 64 IN | BODY MASS INDEX: 43.02 KG/M2 | WEIGHT: 252 LBS | HEART RATE: 60 BPM

## 2018-01-17 PROCEDURE — 73562 X-RAY EXAM OF KNEE 3: CPT | Mod: 50

## 2018-01-17 PROCEDURE — 99214 OFFICE O/P EST MOD 30 MIN: CPT

## 2018-01-30 ENCOUNTER — APPOINTMENT (OUTPATIENT)
Dept: DERMATOLOGY | Facility: CLINIC | Age: 73
End: 2018-01-30
Payer: MEDICARE

## 2018-01-30 PROCEDURE — 99212 OFFICE O/P EST SF 10 MIN: CPT

## 2018-03-19 ENCOUNTER — MEDICATION RENEWAL (OUTPATIENT)
Age: 73
End: 2018-03-19

## 2018-09-11 ENCOUNTER — APPOINTMENT (OUTPATIENT)
Dept: DERMATOLOGY | Facility: CLINIC | Age: 73
End: 2018-09-11
Payer: MEDICARE

## 2018-09-11 PROBLEM — N20.0 CALCULUS OF KIDNEY: Chronic | Status: ACTIVE | Noted: 2017-03-06

## 2018-09-11 PROCEDURE — 99213 OFFICE O/P EST LOW 20 MIN: CPT

## 2018-10-02 ENCOUNTER — OTHER (OUTPATIENT)
Age: 73
End: 2018-10-02

## 2018-12-06 ENCOUNTER — APPOINTMENT (OUTPATIENT)
Dept: PULMONOLOGY | Facility: CLINIC | Age: 73
End: 2018-12-06
Payer: MEDICARE

## 2018-12-06 ENCOUNTER — CHART COPY (OUTPATIENT)
Age: 73
End: 2018-12-06

## 2018-12-06 VITALS
WEIGHT: 285 LBS | SYSTOLIC BLOOD PRESSURE: 124 MMHG | OXYGEN SATURATION: 95 % | DIASTOLIC BLOOD PRESSURE: 76 MMHG | HEART RATE: 78 BPM | HEIGHT: 64 IN | BODY MASS INDEX: 48.65 KG/M2

## 2018-12-06 PROCEDURE — 99214 OFFICE O/P EST MOD 30 MIN: CPT

## 2018-12-06 RX ORDER — CEPHALEXIN 500 MG/1
500 CAPSULE ORAL 4 TIMES DAILY
Qty: 28 | Refills: 0 | Status: DISCONTINUED | COMMUNITY
Start: 2017-11-29 | End: 2018-12-06

## 2018-12-17 ENCOUNTER — APPOINTMENT (OUTPATIENT)
Dept: OBGYN | Facility: CLINIC | Age: 73
End: 2018-12-17
Payer: MEDICARE

## 2018-12-17 VITALS
SYSTOLIC BLOOD PRESSURE: 114 MMHG | BODY MASS INDEX: 47.8 KG/M2 | WEIGHT: 280 LBS | HEIGHT: 64 IN | DIASTOLIC BLOOD PRESSURE: 73 MMHG | HEART RATE: 60 BPM

## 2018-12-17 DIAGNOSIS — Z01.419 ENCOUNTER FOR GYNECOLOGICAL EXAMINATION (GENERAL) (ROUTINE) W/OUT ABNORMAL FINDINGS: ICD-10-CM

## 2018-12-17 PROCEDURE — 99213 OFFICE O/P EST LOW 20 MIN: CPT

## 2019-01-16 ENCOUNTER — APPOINTMENT (OUTPATIENT)
Dept: ORTHOPEDIC SURGERY | Facility: CLINIC | Age: 74
End: 2019-01-16
Payer: MEDICARE

## 2019-01-16 PROCEDURE — 73562 X-RAY EXAM OF KNEE 3: CPT | Mod: 50

## 2019-01-16 PROCEDURE — 99213 OFFICE O/P EST LOW 20 MIN: CPT

## 2019-01-16 NOTE — PHYSICAL EXAM
[Normal] : Gait: normal [Obese] : obese [LE] : Sensory: Intact in bilateral lower extremities [DP] : dorsalis pedis 2+ and symmetric bilaterally [PT] : posterior tibial 2+ and symmetric bilaterally [de-identified] : General appearance: Well nourished, well developed, pleasant, alert, and oriented x3.\par Respiratory: Breathing not labored, in no acute distress.\par HEENT: Normocephalic. EOM intact. Sclerae are clear.\par CV: No apparent abnormalities. No lower leg edema. No varicosities. Pedal pulses are palpable.\par Neurologic: Sensation is intact to light touch in the upper and lower extremities. No muscle weakness.\par Dermatologic: No apparent skin lesions or rash.\par Spine: C spine and L spine appear normal and move freely, normal and nontender.\par Upper Extremities: Hands, wrists, and elbows are normal and move freely. Shoulders are normal and move freely. All range of motion is symmetrical.\par Normal body habitus. Pulses are palpable.\par Review of systems, please see form for complete details. Medical data sheet was reviewed.\par  \par Left knee, FROM hip, well healing midline incision, no effusion, 0 - 120 degrees, no crepitus, no medial pain, no lateral pain, no Lachman, no pivot shift, no anterior drawer, no posterior drawer, stable, anatomic alignment. Lateral laceration noted, but no evidence of infection. 2+ edema\par Right knee, FROM hip, well healing midline incision, no effusion, 0 - 125 degrees, no crepitus, no medial pain, no lateral pain, no Lachman, no pivot shift, no anterior drawer, no posterior drawer, stable, anatomic alignment. 2+ edema with stasis noted\par  [de-identified] : BL Knee xrays, taken at the office today:\par Standing AP, Lateral, and Merchant films show:\par Total knee replacement hardware in neutral alignment. Without evidence of loosening. well centered patella\par \par \par \par  [Poor Appearance] : well-appearing [Acute Distress] : not in acute distress [FreeTextEntry2] : \par \par

## 2019-01-16 NOTE — REASON FOR VISIT
[Follow-Up Visit] : a follow-up visit for [Knee Pain] : knee pain [FreeTextEntry2] : Bilateral knee pain

## 2019-01-16 NOTE — ADDENDUM
[FreeTextEntry1] : Documented by Tanna Cole acting as a scribe for Dr. Mc on 01/16/2019 \par \par All medical record entries made by the Scribe were at my, Dr. Mc, direction and\par personally dictated by me on 01/16/2019 . I have reviewed the chart and agree that the record\par accurately reflects my personal performance of the history, physical exam, procedure and imaging.\par \par \par

## 2019-04-10 ENCOUNTER — TRANSCRIPTION ENCOUNTER (OUTPATIENT)
Age: 74
End: 2019-04-10

## 2019-04-22 ENCOUNTER — APPOINTMENT (OUTPATIENT)
Age: 74
End: 2019-04-22
Payer: MEDICARE

## 2019-04-22 VITALS
HEIGHT: 64 IN | SYSTOLIC BLOOD PRESSURE: 130 MMHG | OXYGEN SATURATION: 96 % | DIASTOLIC BLOOD PRESSURE: 78 MMHG | RESPIRATION RATE: 16 BRPM | BODY MASS INDEX: 46.95 KG/M2 | HEART RATE: 64 BPM | WEIGHT: 275 LBS

## 2019-04-22 DIAGNOSIS — R19.7 DIARRHEA, UNSPECIFIED: ICD-10-CM

## 2019-04-22 DIAGNOSIS — Z86.79 PERSONAL HISTORY OF OTHER DISEASES OF THE CIRCULATORY SYSTEM: ICD-10-CM

## 2019-04-22 PROCEDURE — 82272 OCCULT BLD FECES 1-3 TESTS: CPT

## 2019-04-22 PROCEDURE — 99203 OFFICE O/P NEW LOW 30 MIN: CPT

## 2019-04-22 NOTE — ASSESSMENT
[FreeTextEntry1] : Episodic diarrhea, fairly persistent over the course of the past 7 weeks. Recent foreign travel to [place]. Cannot exclude parasites or travels diarrhea. Possible C. difficile given the multiple recent courses of antibiotics.\par Plan blood work to include CBC, CMP; stool studies to include C. difficile, C&S, O&P, Giardia, Ferreira stain. Adequate hydration. Low-residue diet. Use of probiotic such as for source 500 mg p.o. b.i.d. for at least one month. If not 3. Use of Kaopectate for control of diarrhea. Office follow up in 2 months.

## 2019-04-22 NOTE — CONSULT LETTER
[Dear  ___] : Dear  [unfilled], [Consult Letter:] : I had the pleasure of evaluating your patient, [unfilled]. [Please see my note below.] : Please see my note below. [Consult Closing:] : Thank you very much for allowing me to participate in the care of this patient.  If you have any questions, please do not hesitate to contact me. [Sincerely,] : Sincerely, [FreeTextEntry1] : Subacute diarrhea of considerable volume. Multiple recent courses of antibiotics for UTIs and kidney stones. Rule out C. difficile colitis, versus infectious colitis given recent travel to [place]. Stool studies requested. Basic CBC and CMP requested. Low-residue diet. Probiotic Kaopectate to be utilized. [FreeTextEntry3] : Mika Delgadillo MD FACG\par Diplomate American Board of Internal Medicine and Gastroenterolgy\par [facility]\par

## 2019-04-22 NOTE — HISTORY OF PRESENT ILLNESS
[FreeTextEntry1] : 73-year-old white female with history of hypertension, hyperlipidemia, glaucoma, orbit, obesity, colon polyps. Last colonoscopy was slightly over 3 years ago with the finding of a rectal 1 cm tubular adenoma completely removed. Patient was initially returning for a surveillance colonoscopy. She also has a history of atrial fibrillation and ablation and is currently maintained on Xarelto. Upon return home from a prolonged vacation to [place] and [facility] about one week later patient developed kidney stones and 3. Unresolving, urinary tract infections, for which multiple antibiotics have been employed. Some include Cipro and Levaquin and other antibiotics. The diarrhea started almost initially with the use of antibiotics. There has been sporadic blood illness. She will have up to 9 bowel movements per day with cramps and urgency. Some prolonged rectal bleeding noted. No distention. No fever. No tenderness. Feels unwell. Tired, weak, but not toxic. Patient appears somewhat dry. No prior history of C. difficile colitis. No prior history of colitis.

## 2019-04-22 NOTE — PHYSICAL EXAM
[General Appearance - Alert] : alert [General Appearance - In No Acute Distress] : in no acute distress [Sclera] : the sclera and conjunctiva were normal [PERRL With Normal Accommodation] : pupils were equal in size, round, and reactive to light [Extraocular Movements] : extraocular movements were intact [Outer Ear] : the ears and nose were normal in appearance [Oropharynx] : the oropharynx was normal [Neck Appearance] : the appearance of the neck was normal [Neck Cervical Mass (___cm)] : no neck mass was observed [Jugular Venous Distention Increased] : there was no jugular-venous distention [Thyroid Diffuse Enlargement] : the thyroid was not enlarged [Thyroid Nodule] : there were no palpable thyroid nodules [Auscultation Breath Sounds / Voice Sounds] : lungs were clear to auscultation bilaterally [Heart Rate And Rhythm] : heart rate was normal and rhythm regular [Heart Sounds] : normal S1 and S2 [Heart Sounds Gallop] : no gallops [Murmurs] : no murmurs [Heart Sounds Pericardial Friction Rub] : no pericardial rub [Bowel Sounds] : normal bowel sounds [Abdomen Soft] : soft [Abdomen Tenderness] : non-tender [Abdomen Mass (___ Cm)] : no abdominal mass palpated [No CVA Tenderness] : no ~M costovertebral angle tenderness [No Spinal Tenderness] : no spinal tenderness [Abnormal Walk] : normal gait [Nail Clubbing] : no clubbing  or cyanosis of the fingernails [Musculoskeletal - Swelling] : no joint swelling seen [Motor Tone] : muscle strength and tone were normal [Skin Color & Pigmentation] : normal skin color and pigmentation [Skin Turgor] : normal skin turgor [] : no rash

## 2019-04-22 NOTE — REASON FOR VISIT
[Consultation] : a consultation visit [FreeTextEntry1] : history of tubular adenoma. Recent large-volume diarrhea

## 2019-04-25 ENCOUNTER — RX CHANGE (OUTPATIENT)
Age: 74
End: 2019-04-25

## 2019-04-25 LAB
ALBUMIN SERPL ELPH-MCNC: 3.5 G/DL
ALP BLD-CCNC: 66 U/L
ALT SERPL-CCNC: 17 U/L
ANION GAP SERPL CALC-SCNC: 16 MMOL/L
AST SERPL-CCNC: 26 U/L
BASOPHILS # BLD AUTO: 0.04 K/UL
BASOPHILS NFR BLD AUTO: 0.6 %
BILIRUB SERPL-MCNC: 1 MG/DL
BUN SERPL-MCNC: 12 MG/DL
C DIFF TOX GENS STL QL NAA+PROBE: NORMAL
CALCIUM SERPL-MCNC: 8.8 MG/DL
CDIFF BY PCR: DETECTED
CHLORIDE SERPL-SCNC: 93 MMOL/L
CO2 SERPL-SCNC: 24 MMOL/L
CREAT SERPL-MCNC: 0.89 MG/DL
EOSINOPHIL # BLD AUTO: 0.15 K/UL
EOSINOPHIL NFR BLD AUTO: 2.1 %
GLUCOSE SERPL-MCNC: 93 MG/DL
HCT VFR BLD CALC: 34.5 %
HGB BLD-MCNC: 11.5 G/DL
IMM GRANULOCYTES NFR BLD AUTO: 0.4 %
LYMPHOCYTES # BLD AUTO: 1.23 K/UL
LYMPHOCYTES NFR BLD AUTO: 17.4 %
MAN DIFF?: NORMAL
MCHC RBC-ENTMCNC: 30.3 PG
MCHC RBC-ENTMCNC: 33.3 GM/DL
MCV RBC AUTO: 90.8 FL
MONOCYTES # BLD AUTO: 0.83 K/UL
MONOCYTES NFR BLD AUTO: 11.7 %
NEUTROPHILS # BLD AUTO: 4.79 K/UL
NEUTROPHILS NFR BLD AUTO: 67.8 %
PLATELET # BLD AUTO: 232 K/UL
POTASSIUM SERPL-SCNC: 2.9 MMOL/L
PROT SERPL-MCNC: 6.2 G/DL
RBC # BLD: 3.8 M/UL
RBC # FLD: 13 %
SODIUM SERPL-SCNC: 133 MMOL/L
WBC # FLD AUTO: 7.07 K/UL
WRIGHT STN STL: POSITIVE

## 2019-04-28 LAB
BACTERIA STL CULT: NORMAL
DEPRECATED O AND P PREP STL: NORMAL
G LAMBLIA AG STL QL: NORMAL

## 2019-05-10 ENCOUNTER — RX RENEWAL (OUTPATIENT)
Age: 74
End: 2019-05-10

## 2019-05-24 ENCOUNTER — APPOINTMENT (OUTPATIENT)
Dept: GASTROENTEROLOGY | Facility: CLINIC | Age: 74
End: 2019-05-24
Payer: MEDICARE

## 2019-05-24 VITALS
RESPIRATION RATE: 16 BRPM | BODY MASS INDEX: 45.24 KG/M2 | WEIGHT: 265 LBS | DIASTOLIC BLOOD PRESSURE: 76 MMHG | OXYGEN SATURATION: 98 % | HEART RATE: 84 BPM | HEIGHT: 64 IN | SYSTOLIC BLOOD PRESSURE: 128 MMHG

## 2019-05-24 PROCEDURE — 99214 OFFICE O/P EST MOD 30 MIN: CPT

## 2019-05-24 NOTE — HISTORY OF PRESENT ILLNESS
[FreeTextEntry1] : 73-year-old white female with history of hypertension, hyperlipidemia, obesity, and recurrence stone related urinary tract infections over the past several months. Patient had developed a documented C. difficile infection in mid-April. Bowel movements for about 10 per day. She was treated with p.o. Vanco 125 mg p.o. q.d. for a two-week period of time symptoms resolved. The patient required an additional course of antibiotics, Levaquin, for non-resolving, urinary tract infection. Therefore, it was elected to give her any two-week course. Concomitant with her antibiotic of Vanco 125 q.i.d. Patient completed. The Levaquin one week ago and completed the vancomycin today. Currently feels well. However, believes that the urinary tract infection, persists. Patient is due for urologic evaluation on 6/3. Currently, she is having 2 soft bowel movements per day without emergency cramps, bleeding, pain or distention. Bowel movements have almost returned to normal.\par Recall the patient had a 1 cm tubular adenoma removed from the rectum 3 years ago.

## 2019-05-24 NOTE — ASSESSMENT
[FreeTextEntry1] : Single documented bout of C. difficile colitis. Patient clinically seems to have resolved  the C. difficile infection. Patient may have a persistent urinary tract infection. Patient is at risk for redevelopment of C. difficile infection showed additional antibiotics be used.\par Currently patient was advised to continue the floor, so for another 2 months. If patient is to receive a single dose of antibiotics. Around the time of dental work. Then a one-week course of Florastor was advised.  If a prolonged course of antibiotics is resumed. The patient will contact me and will likely be restarted on low dose nitroglycerin to cover that antibiotic exposure.  Office followup in 4 months. \par Given personal history of a large rectal tubular adenoma. Eventually, a surveillance colonoscopy will be arranged. It is anticipated that this procedure would be done in about 6 months to allow for complete resolution of the C. difficile. Arrangements to be made in the future.

## 2019-05-24 NOTE — PHYSICAL EXAM
[General Appearance - Alert] : alert [General Appearance - In No Acute Distress] : in no acute distress [Sclera] : the sclera and conjunctiva were normal [PERRL With Normal Accommodation] : pupils were equal in size, round, and reactive to light [Outer Ear] : the ears and nose were normal in appearance [Oropharynx] : the oropharynx was normal [Extraocular Movements] : extraocular movements were intact [Neck Appearance] : the appearance of the neck was normal [Neck Cervical Mass (___cm)] : no neck mass was observed [Thyroid Diffuse Enlargement] : the thyroid was not enlarged [Thyroid Nodule] : there were no palpable thyroid nodules [Jugular Venous Distention Increased] : there was no jugular-venous distention [Auscultation Breath Sounds / Voice Sounds] : lungs were clear to auscultation bilaterally [Heart Rate And Rhythm] : heart rate was normal and rhythm regular [Heart Sounds] : normal S1 and S2 [Murmurs] : no murmurs [Heart Sounds Gallop] : no gallops [Heart Sounds Pericardial Friction Rub] : no pericardial rub [Bowel Sounds] : normal bowel sounds [Abdomen Tenderness] : non-tender [Abdomen Soft] : soft [Abdomen Mass (___ Cm)] : no abdominal mass palpated [No CVA Tenderness] : no ~M costovertebral angle tenderness [No Spinal Tenderness] : no spinal tenderness [Nail Clubbing] : no clubbing  or cyanosis of the fingernails [Abnormal Walk] : normal gait [Musculoskeletal - Swelling] : no joint swelling seen [Motor Tone] : muscle strength and tone were normal [] : no rash [Skin Color & Pigmentation] : normal skin color and pigmentation [Skin Turgor] : normal skin turgor

## 2019-07-08 ENCOUNTER — RX RENEWAL (OUTPATIENT)
Age: 74
End: 2019-07-08

## 2019-07-29 ENCOUNTER — APPOINTMENT (OUTPATIENT)
Dept: GASTROENTEROLOGY | Facility: CLINIC | Age: 74
End: 2019-07-29

## 2019-10-10 ENCOUNTER — APPOINTMENT (OUTPATIENT)
Dept: DERMATOLOGY | Facility: CLINIC | Age: 74
End: 2019-10-10
Payer: MEDICARE

## 2019-10-10 PROCEDURE — 99213 OFFICE O/P EST LOW 20 MIN: CPT

## 2019-10-22 ENCOUNTER — APPOINTMENT (OUTPATIENT)
Dept: GASTROENTEROLOGY | Facility: CLINIC | Age: 74
End: 2019-10-22
Payer: MEDICARE

## 2019-10-22 VITALS
HEART RATE: 61 BPM | SYSTOLIC BLOOD PRESSURE: 160 MMHG | RESPIRATION RATE: 14 BRPM | OXYGEN SATURATION: 98 % | DIASTOLIC BLOOD PRESSURE: 90 MMHG | WEIGHT: 280 LBS | BODY MASS INDEX: 48.06 KG/M2

## 2019-10-22 DIAGNOSIS — A04.72 ENTEROCOLITIS DUE TO CLOSTRIDIUM DIFFICILE, NOT SPECIFIED AS RECURRENT: ICD-10-CM

## 2019-10-22 PROCEDURE — 99214 OFFICE O/P EST MOD 30 MIN: CPT

## 2019-10-22 RX ORDER — PANTOPRAZOLE SODIUM 40 MG/1
40 GRANULE, DELAYED RELEASE ORAL
Refills: 0 | Status: ACTIVE | COMMUNITY

## 2019-10-22 RX ORDER — OXYBUTYNIN CHLORIDE 5 MG/1
5 TABLET ORAL
Refills: 0 | Status: ACTIVE | COMMUNITY

## 2019-10-22 RX ORDER — HYDROCORTISONE 25 MG/G
2.5 CREAM TOPICAL
Qty: 1 | Refills: 3 | Status: DISCONTINUED | COMMUNITY
Start: 2019-04-22 | End: 2019-10-22

## 2019-10-22 RX ORDER — CONJUGATED ESTROGENS 0.62 MG/G
0.62 CREAM VAGINAL
Refills: 0 | Status: ACTIVE | COMMUNITY

## 2019-10-22 RX ORDER — ALPRAZOLAM 0.25 MG/1
0.25 TABLET ORAL
Qty: 60 | Refills: 0 | Status: ACTIVE | COMMUNITY
Start: 2017-04-14

## 2019-10-22 RX ORDER — HYDROCHLOROTHIAZIDE 25 MG/1
25 TABLET ORAL
Qty: 90 | Refills: 0 | Status: ACTIVE | COMMUNITY
Start: 2017-02-06

## 2019-10-22 RX ORDER — DESOXIMETASONE 0.5 MG/G
0.05 CREAM TOPICAL TWICE DAILY
Qty: 1 | Refills: 3 | Status: ACTIVE | COMMUNITY
Start: 2018-03-19

## 2019-10-22 RX ORDER — OLOPATADINE HCL 1 MG/ML
0.1 SOLUTION/ DROPS OPHTHALMIC
Qty: 5 | Refills: 0 | Status: ACTIVE | COMMUNITY
Start: 2017-07-24

## 2019-10-23 NOTE — REASON FOR VISIT
[Follow-Up: _____] : a [unfilled] follow-up visit [FreeTextEntry1] : Recurrent C. difficile infection. Rectal polyp, history of.

## 2019-10-23 NOTE — HISTORY OF PRESENT ILLNESS
[FreeTextEntry1] : 74-year-old white female with history of hypertension, hyperlipidemia, obesity, and atrial fibrillation, pneumonia, COPD, obstructive sleep apnea on her hypertension and recurrent C. difficile. Colitis with personal history of a large tubular adenoma removed from the rectum 3 years ago. Patient has had several documented attacks of C. difficile colitis ever since her first episode in April, 2019. She has had issues with recurring urinary tract infections due to stone disease and has frequently. We choir courses of antibiotics which have only flared" his relapses of her C. difficile colitis. Currently, she has been antibiotic free for about a month. She has about 2 soft bowel movements per day without any urgency, cramps, bleeding, or diarrhea. She denies having any abdominal pain. No fever. She has had moderate weight gain of late. It is currently up to about to 275. \par She is overdue for a surveillance colonoscopy given the personal history of a large VTA in the rectum, removed 3.5 years ago.

## 2019-10-23 NOTE — ASSESSMENT
[FreeTextEntry1] : C. difficile infection, seemingly under reasonable control. Patient sent for different courses of p.o. vancomycin along with her other antibiotics required for treatment of urinary tract infections. Currently on florastor.\par Currently appears to be in atrial fibrillation again. Plan is to proceed with scheduling for a polyp surveillance colonoscopy. The procedure will be performed. Its outside hospital as patient's has a high BMI and atrial fibrillation. Xarelto will be held for 3 days prior to the procedure. Cardiac clearance with Dr. Dooley has been requested prior to the procedure. Suprabulbar utilized. Both doses on day prior to the procedure rescheduled testing will be employed. ASA #3. Mallampati #2. The procedure, its risks, benefits, and prepped, were explained to the patient, who understands and is agreeable to proceed. Patient is an optimal medical condition to undergo planned procedure. Results to follow. Arrangements made.

## 2020-02-10 ENCOUNTER — OUTPATIENT (OUTPATIENT)
Dept: OUTPATIENT SERVICES | Facility: HOSPITAL | Age: 75
LOS: 1 days | End: 2020-02-10
Payer: MEDICARE

## 2020-02-10 VITALS
TEMPERATURE: 99 F | DIASTOLIC BLOOD PRESSURE: 78 MMHG | SYSTOLIC BLOOD PRESSURE: 163 MMHG | HEIGHT: 64 IN | WEIGHT: 273.81 LBS | RESPIRATION RATE: 20 BRPM | HEART RATE: 61 BPM

## 2020-02-10 DIAGNOSIS — Z29.9 ENCOUNTER FOR PROPHYLACTIC MEASURES, UNSPECIFIED: ICD-10-CM

## 2020-02-10 DIAGNOSIS — Z98.890 OTHER SPECIFIED POSTPROCEDURAL STATES: Chronic | ICD-10-CM

## 2020-02-10 DIAGNOSIS — Z01.818 ENCOUNTER FOR OTHER PREPROCEDURAL EXAMINATION: ICD-10-CM

## 2020-02-10 DIAGNOSIS — Z90.49 ACQUIRED ABSENCE OF OTHER SPECIFIED PARTS OF DIGESTIVE TRACT: Chronic | ICD-10-CM

## 2020-02-10 DIAGNOSIS — Z13.89 ENCOUNTER FOR SCREENING FOR OTHER DISORDER: ICD-10-CM

## 2020-02-10 DIAGNOSIS — Z98.89 OTHER SPECIFIED POSTPROCEDURAL STATES: Chronic | ICD-10-CM

## 2020-02-10 DIAGNOSIS — A04.72 ENTEROCOLITIS DUE TO CLOSTRIDIUM DIFFICILE, NOT SPECIFIED AS RECURRENT: ICD-10-CM

## 2020-02-10 DIAGNOSIS — I48.0 PAROXYSMAL ATRIAL FIBRILLATION: ICD-10-CM

## 2020-02-10 DIAGNOSIS — Z95.0 PRESENCE OF CARDIAC PACEMAKER: Chronic | ICD-10-CM

## 2020-02-10 LAB
ANION GAP SERPL CALC-SCNC: 12 MMOL/L — SIGNIFICANT CHANGE UP (ref 5–17)
APTT BLD: 43 SEC — HIGH (ref 27.5–36.3)
BUN SERPL-MCNC: 26 MG/DL — HIGH (ref 8–20)
CALCIUM SERPL-MCNC: 9.1 MG/DL — SIGNIFICANT CHANGE UP (ref 8.6–10.2)
CHLORIDE SERPL-SCNC: 102 MMOL/L — SIGNIFICANT CHANGE UP (ref 98–107)
CO2 SERPL-SCNC: 26 MMOL/L — SIGNIFICANT CHANGE UP (ref 22–29)
CREAT SERPL-MCNC: 0.84 MG/DL — SIGNIFICANT CHANGE UP (ref 0.5–1.3)
GLUCOSE SERPL-MCNC: 108 MG/DL — HIGH (ref 70–99)
HCT VFR BLD CALC: 38.7 % — SIGNIFICANT CHANGE UP (ref 34.5–45)
HGB BLD-MCNC: 12.5 G/DL — SIGNIFICANT CHANGE UP (ref 11.5–15.5)
INR BLD: 1.76 RATIO — HIGH (ref 0.88–1.16)
MCHC RBC-ENTMCNC: 30.7 PG — SIGNIFICANT CHANGE UP (ref 27–34)
MCHC RBC-ENTMCNC: 32.3 GM/DL — SIGNIFICANT CHANGE UP (ref 32–36)
MCV RBC AUTO: 95.1 FL — SIGNIFICANT CHANGE UP (ref 80–100)
PLATELET # BLD AUTO: 225 K/UL — SIGNIFICANT CHANGE UP (ref 150–400)
POTASSIUM SERPL-MCNC: 4 MMOL/L — SIGNIFICANT CHANGE UP (ref 3.5–5.3)
POTASSIUM SERPL-SCNC: 4 MMOL/L — SIGNIFICANT CHANGE UP (ref 3.5–5.3)
PROTHROM AB SERPL-ACNC: 20.2 SEC — HIGH (ref 10–12.9)
RBC # BLD: 4.07 M/UL — SIGNIFICANT CHANGE UP (ref 3.8–5.2)
RBC # FLD: 12.9 % — SIGNIFICANT CHANGE UP (ref 10.3–14.5)
SODIUM SERPL-SCNC: 140 MMOL/L — SIGNIFICANT CHANGE UP (ref 135–145)
WBC # BLD: 5.08 K/UL — SIGNIFICANT CHANGE UP (ref 3.8–10.5)
WBC # FLD AUTO: 5.08 K/UL — SIGNIFICANT CHANGE UP (ref 3.8–10.5)

## 2020-02-10 PROCEDURE — 85610 PROTHROMBIN TIME: CPT

## 2020-02-10 PROCEDURE — 93010 ELECTROCARDIOGRAM REPORT: CPT

## 2020-02-10 PROCEDURE — 36415 COLL VENOUS BLD VENIPUNCTURE: CPT

## 2020-02-10 PROCEDURE — 85730 THROMBOPLASTIN TIME PARTIAL: CPT

## 2020-02-10 PROCEDURE — G0463: CPT

## 2020-02-10 PROCEDURE — 93005 ELECTROCARDIOGRAM TRACING: CPT

## 2020-02-10 PROCEDURE — 85027 COMPLETE CBC AUTOMATED: CPT

## 2020-02-10 PROCEDURE — 80048 BASIC METABOLIC PNL TOTAL CA: CPT

## 2020-02-10 RX ORDER — METOPROLOL TARTRATE 50 MG
1 TABLET ORAL
Qty: 0 | Refills: 0 | DISCHARGE

## 2020-02-10 RX ORDER — NITROFURANTOIN MACROCRYSTAL 50 MG
1 CAPSULE ORAL
Qty: 0 | Refills: 0 | DISCHARGE

## 2020-02-10 RX ORDER — PANTOPRAZOLE SODIUM 20 MG/1
1 TABLET, DELAYED RELEASE ORAL
Qty: 0 | Refills: 0 | DISCHARGE

## 2020-02-10 RX ORDER — ESTROGENS, CONJUGATED 0.625 MG/G
0 CREAM WITH APPLICATOR VAGINAL
Qty: 0 | Refills: 0 | DISCHARGE

## 2020-02-10 RX ORDER — ERYTHROMYCIN BASE 5 MG/GRAM
1 OINTMENT (GRAM) OPHTHALMIC (EYE)
Qty: 0 | Refills: 0 | DISCHARGE

## 2020-02-10 RX ORDER — OLOPATADINE HYDROCHLORIDE 1 MG/ML
1 SOLUTION/ DROPS OPHTHALMIC
Qty: 0 | Refills: 0 | DISCHARGE

## 2020-02-10 RX ORDER — SACCHAROMYCES BOULARDII 250 MG
1 POWDER IN PACKET (EA) ORAL
Qty: 0 | Refills: 0 | DISCHARGE

## 2020-02-10 NOTE — H&P PST ADULT - BP NONINVASIVE MEAN (MM HG)
Pulmonary Progress Note        NAME: Mesha Meier - ROOM: [id number]/[id number]-Y - MRN: [id number] - Age: 79year old - : 1925        Last 24hrs: No events overnight, calling out for assistance, feels that this is a terrible hospital    OBJECTIVE:   10/2 normal, atraumatic, no cyanosis or edema    Recent Labs      10/19/18   2100  10/21/18   2140  10/22/18   0626   WBC  10.7  15.6*  11.7   HGB  12.5  12.3  12.6   MCV  93.2  94.3  93.6   PLT  323.0  315.0  302.0   INR   --   1.22*   --        Recent Labs [place], [place],[id number]   09/08/2011 05:10 AM 1.990 0.350 - 5.500 mIU/mL Final     Albumin   Date/Time Value Ref Range Status   10/22/2018 06:26 AM 2.7 (L) 3.1 - 4.5 g/dL Final     ALBUMIN   Date/Time Value Ref Range Status   06/14/2014 02:30 PM 3.5 3.5 - 106

## 2020-02-10 NOTE — H&P PST ADULT - NSICDXPASTMEDICALHX_GEN_ALL_CORE_FT
PAST MEDICAL HISTORY:  Arthritis     Hyperlipidemia     Hypertension     Mitral regurgitation     Narrow angle glaucoma suspect     Near syncope     Obesity     Paroxysmal atrial fibrillation     Renal stone     Sleep apnea, unspecified type     SSS (sick sinus syndrome)

## 2020-02-10 NOTE — H&P PST ADULT - NSICDXPASTSURGICALHX_GEN_ALL_CORE_FT
PAST SURGICAL HISTORY:  H/O prior ablation treatment cardiac    H/O umbilical hernia repair     History of appendectomy     Left heart catheterization     Presence of cardiac pacemaker [employer] dual chamber    S/P anal fissurectomy     S/P cholecystectomy     S/P knee surgery right  2013    S/P tonsillectomy PAST SURGICAL HISTORY:  H/O prior ablation treatment cardiac    H/O umbilical hernia repair     History of appendectomy     Left heart catheterization     Presence of cardiac pacemaker [employer] dual chamber    S/P anal fissurectomy     S/P cholecystectomy     S/P knee surgery right and left  2013    S/P tonsillectomy

## 2020-02-10 NOTE — H&P PST ADULT - NSICDXPROBLEM_GEN_ALL_CORE_FT
PROBLEM DIAGNOSES  Problem: Enterocolitis due to Clostridium difficile  Assessment and Plan: Colonoscopy  Medical Clearance  Cardiology Clearance    Problem: Need for prophylactic measure  Assessment and Plan:     Problem: Screening for substance abuse  Assessment and Plan: PROBLEM DIAGNOSES  Problem: Paroxysmal atrial fibrillation  Assessment and Plan: Follows with Cardiologist     Problem: Enterocolitis due to Clostridium difficile  Assessment and Plan: Colonoscopy  Medical Clearance  Cardiology Clearance    Problem: Need for prophylactic measure  Assessment and Plan: High risk. Surgical team to evaluate need for pharmacologic VTE prophylaxis.     Problem: Screening for substance abuse  Assessment and Plan: Opiod screening score = 1.  Low risk for potential abuse.

## 2020-02-10 NOTE — H&P PST ADULT - ASSESSMENT

## 2020-02-10 NOTE — H&P PST ADULT - NSICDXFAMILYHX_GEN_ALL_CORE_FT
FAMILY HISTORY:  Father  Still living? No  CAD (coronary artery disease), Age at diagnosis: Age Unknown  Family history of coronary artery disease, Age at diagnosis: Age Unknown  Family history of diabetes mellitus, Age at diagnosis: Age Unknown    Mother  Still living? Unknown  Family history of aortic dissection, Age at diagnosis: Age Unknown  Family history of lung cancer, Age at diagnosis: Age Unknown FAMILY HISTORY:  Father  Still living? No  CAD (coronary artery disease), Age at diagnosis: Age Unknown  Family history of coronary artery disease, Age at diagnosis: Age Unknown  Family history of diabetes mellitus, Age at diagnosis: Age Unknown    Mother  Still living? No  Family history of aortic dissection, Age at diagnosis: Age Unknown  Family history of lung cancer, Age at diagnosis: Age Unknown  FH: hypertension, Age at diagnosis: Age Unknown

## 2020-02-10 NOTE — H&P PST ADULT - HISTORY OF PRESENT ILLNESS
This is a 74 y.o female who presents to PST today. This is a 74 y.o female who presents to PST today. Patient reports history of C.Diff spring 2019. Currently denies any signs and symptoms, no nausea, vomiting, or diarrhea. She is scheduled for routine colonoscopy.

## 2020-02-18 ENCOUNTER — APPOINTMENT (OUTPATIENT)
Dept: GASTROENTEROLOGY | Facility: HOSPITAL | Age: 75
End: 2020-02-18

## 2020-02-18 ENCOUNTER — OUTPATIENT (OUTPATIENT)
Dept: OUTPATIENT SERVICES | Facility: HOSPITAL | Age: 75
LOS: 1 days | End: 2020-02-18
Payer: MEDICARE

## 2020-02-18 DIAGNOSIS — Z98.89 OTHER SPECIFIED POSTPROCEDURAL STATES: Chronic | ICD-10-CM

## 2020-02-18 DIAGNOSIS — A04.72 ENTEROCOLITIS DUE TO CLOSTRIDIUM DIFFICILE, NOT SPECIFIED AS RECURRENT: ICD-10-CM

## 2020-02-18 DIAGNOSIS — Z86.010 PERSONAL HISTORY OF COLONIC POLYPS: ICD-10-CM

## 2020-02-18 DIAGNOSIS — Z90.49 ACQUIRED ABSENCE OF OTHER SPECIFIED PARTS OF DIGESTIVE TRACT: Chronic | ICD-10-CM

## 2020-02-18 DIAGNOSIS — Z95.0 PRESENCE OF CARDIAC PACEMAKER: Chronic | ICD-10-CM

## 2020-02-18 DIAGNOSIS — Z98.890 OTHER SPECIFIED POSTPROCEDURAL STATES: Chronic | ICD-10-CM

## 2020-02-18 PROCEDURE — 45378 DIAGNOSTIC COLONOSCOPY: CPT

## 2020-02-18 PROCEDURE — G0105: CPT

## 2020-02-18 NOTE — HISTORY OF PRESENT ILLNESS
[FreeTextEntry1] : 75 yo WF with Htn, HLD, M Obesity, A Fib on Xarelto, COPD/ DUSTY/ prior PNA. PH of colon polyps VTA and TA in the Sigmoid colon on last 2 colonoscopies 2016 and 2012.  Resolved C Diff infections.  Cardiac cleared.  Xarelto on hold x 3 days. No new medical issues.  PST BW OK.

## 2020-02-18 NOTE — PHYSICAL EXAM
[General Appearance - Alert] : alert [General Appearance - In No Acute Distress] : in no acute distress [Sclera] : the sclera and conjunctiva were normal [Extraocular Movements] : extraocular movements were intact [PERRL With Normal Accommodation] : pupils were equal in size, round, and reactive to light [Outer Ear] : the ears and nose were normal in appearance [Oropharynx] : the oropharynx was normal [Neck Appearance] : the appearance of the neck was normal [Neck Cervical Mass (___cm)] : no neck mass was observed [Jugular Venous Distention Increased] : there was no jugular-venous distention [Thyroid Nodule] : there were no palpable thyroid nodules [Thyroid Diffuse Enlargement] : the thyroid was not enlarged [Auscultation Breath Sounds / Voice Sounds] : lungs were clear to auscultation bilaterally [Heart Rate And Rhythm] : heart rate was normal and rhythm regular [Heart Sounds] : normal S1 and S2 [Heart Sounds Gallop] : no gallops [Heart Sounds Pericardial Friction Rub] : no pericardial rub [Murmurs] : no murmurs [Abdomen Tenderness] : non-tender [Bowel Sounds] : normal bowel sounds [Abdomen Soft] : soft [Abdomen Mass (___ Cm)] : no abdominal mass palpated [No CVA Tenderness] : no ~M costovertebral angle tenderness [Nail Clubbing] : no clubbing  or cyanosis of the fingernails [Abnormal Walk] : normal gait [No Spinal Tenderness] : no spinal tenderness [Motor Tone] : muscle strength and tone were normal [Skin Color & Pigmentation] : normal skin color and pigmentation [Musculoskeletal - Swelling] : no joint swelling seen [Skin Turgor] : normal skin turgor [] : no rash

## 2020-02-18 NOTE — ASSESSMENT
[FreeTextEntry1] : Normal surveillance colonoscopy.  Repeat in 5 yrs if health permits.  Regular diet.

## 2020-02-18 NOTE — PROCEDURE
[Hx of Colon Polyps] : history of colon polyps [Procedure Explained] : The procedure was explained [Allergies Reviewed] : allergies reviewed. [Risks] : Risks [Benefits] : benefits [Alternatives] : alternatives [Consent Obtained] : written consent was obtained prior to the procedure and is detailed in the patient's record [Patient] : the patient [Bowel Prep Kit] : the patient took the appropriate bowel preparation kit as directed [Approved Diet Followed] : the patient avoided solid foods and adhered to the approved diet list for 24 hours prior to the procedure [Automated Blood Pressure Cuff] : automated blood pressure cuff [Cardiac Monitor] : cardiac monitor [Pulse Oximeter] : pulse oximeter [Propofol ___ mg IV] : Propofol [unfilled] ~Umg intravenously [3] : 3 [Prep Qualtiy: ___] : Prep Quality:  [unfilled] [Withdrawal Time: ___] : Withdrawal Time:  [unfilled] [Left Lateral Decubitus] : The patient was positioned in the left lateral decubitus position [Abnormal Rectum] : a normal rectum [External Hemorrhoids] : no external hemorrhoids [Terminal Ileum via Ileocecal Valve] : and the terminal ileum was examined by entering the ileocecal valve [Moderate Difficulty] : with moderate difficulty [Insufflated] : insufflated [Multiple Passes Needed] : after multiple passes [Retroflex View] : a retroflex view of the rectum was performed [Normal] : Normal [Tolerated Well] : the patient tolerated the procedure well [Vital Signs Stable] : the vital signs were stable [No Complications] : There were no complications [de-identified] : Suprep [de-identified] : No compression required.   [de-identified] : TI / ICV were normal.   [de-identified] : Including a retroflexion exam.  No hemorrhoids.  No proctitis.  [de-identified] : Normal surveillance colonoscopy.

## 2020-03-09 ENCOUNTER — APPOINTMENT (OUTPATIENT)
Dept: ORTHOPEDIC SURGERY | Facility: CLINIC | Age: 75
End: 2020-03-09
Payer: MEDICARE

## 2020-03-09 VITALS — BODY MASS INDEX: 47.8 KG/M2 | WEIGHT: 280 LBS | HEIGHT: 64 IN | TEMPERATURE: 98.1 F

## 2020-03-09 DIAGNOSIS — Z47.1 AFTERCARE FOLLOWING JOINT REPLACEMENT SURGERY: ICD-10-CM

## 2020-03-09 DIAGNOSIS — Z96.652 PRESENCE OF LEFT ARTIFICIAL KNEE JOINT: ICD-10-CM

## 2020-03-09 DIAGNOSIS — Z96.651 AFTERCARE FOLLOWING JOINT REPLACEMENT SURGERY: ICD-10-CM

## 2020-03-09 DIAGNOSIS — Z96.652 AFTERCARE FOLLOWING JOINT REPLACEMENT SURGERY: ICD-10-CM

## 2020-03-09 DIAGNOSIS — Z96.651 PRESENCE OF RIGHT ARTIFICIAL KNEE JOINT: ICD-10-CM

## 2020-03-09 PROCEDURE — 73562 X-RAY EXAM OF KNEE 3: CPT | Mod: 50

## 2020-03-09 PROCEDURE — 99214 OFFICE O/P EST MOD 30 MIN: CPT

## 2020-03-09 NOTE — HISTORY OF PRESENT ILLNESS
[Pain Location] : pain [Improving] : improving [___ wks] : [unfilled] week(s) ago [1] : a current pain level of 1/10 [Rest] : relieved by rest [de-identified] : 74 year old female here for evaluation of bilateral TKA implants. patient reports a fall onto her left knee a week ago she had some bruised though she was concerned with her implants. Her surgery was done in 2017 by Dr. Mc. Her pain is rapidly improving today she reports 1/10 pain she is rarely taking Tylenol she is ambulating well without using a cane\par pt had right TKA in 2013. she denies pain in the right knee

## 2020-03-09 NOTE — ADDENDUM
[FreeTextEntry1] : I, Paco Heart, acted solely as a scribe for Dr. Arya Trujillo on this date 03/09/2020.

## 2020-03-09 NOTE — PHYSICAL EXAM
[LE] : Sensory: Intact in bilateral lower extremities [ALL] : dorsalis pedis, posterior tibial, femoral, popliteal, and radial 2+ and symmetric bilaterally [Normal] : Alert and in no acute distress [Obese] : obese [Antalgic] : not antalgic [Poor Appearance] : well-appearing [Acute Distress] : not in acute distress [de-identified] : GENERAL APPEARANCE: Well nourished and hydrated, pleasant, alert, and oriented x 3. Appears their stated age. \par HEENT: Normocephalic, extraocular eye motion intact. Nasal septum midline. Oral cavity clear. External auditory canal clear. \par RESPIRATORY: Breath sounds clear and audible in all lobes. No wheezing, No accessory muscle use.\par CARDIOVASCULAR: No apparent abnormalities. No lower leg edema. No varicosities. Pedal pulses are palpable.\par NEUROLOGIC: Sensation is normal, no muscle weakness in the upper or lower extremities.\par DERMATOLOGIC: No apparent skin lesions, moist, warm, no rash.\par SPINE: Cervical spine appears normal and moves freely; thoracic spine appears normal and moves freely; lumbosacral spine appears normal and moves freely, normal, nontender.\par MUSCULOSKELETAL: Hands, wrists, and elbows are normal and move freely, shoulders are normal and move freely.  [de-identified] : Bilateral knee exam shows healed midline incision range of motion is 0-100° without pain. She has ecchymosis in the anterior aspect of the left knee with no skin breakdown [de-identified] : 5V Xray of the bilateral knees done in office today and reviewed by Dr. Arya Trujillo demonstrates s/p bilateral TKA with implants in good positioning, no sign of wear, loosening or subsidence.

## 2020-03-09 NOTE — REASON FOR VISIT
[Initial Visit] : an initial visit for [Artificial Knee Joint] : artificial knee joint [Knee Pain] : knee pain [FreeTextEntry2] : Bilateral TKR follow up

## 2020-03-09 NOTE — DISCUSSION/SUMMARY
[de-identified] : 74 year old  female s/p bilateral TKA with Dr. Mc. Xrays were reviewed and the patient was reassured that their bilateral TKA components are in good position with no signs of loosening or wear after a recent fall onto her left knee. Her physical exam findings are also reassuring. Overall she is doing well with regards to her bilateral TKA. I discussed the importance of antibiotic use with any dental work. She will F/U with us PRN.

## 2020-04-14 ENCOUNTER — APPOINTMENT (OUTPATIENT)
Dept: PULMONOLOGY | Facility: CLINIC | Age: 75
End: 2020-04-14
Payer: MEDICARE

## 2020-04-14 PROCEDURE — 99213 OFFICE O/P EST LOW 20 MIN: CPT | Mod: 95

## 2020-04-14 NOTE — HISTORY OF PRESENT ILLNESS
[Home] : at home, [unfilled] , at the time of the visit. [Medical Office: ([facility])___] : at the medical office located in  [Patient] : the patient [Self] : self [Snoring] : snoring [Frequent Nocturnal Awakening] : frequent nocturnal awakening [Daytime Somnolence] : daytime somnolence [ESS: ___] : ESS score [unfilled] [Awakes Unrefreshed] : awakening unrefreshed [Recent  Weight Gain] : recent weight gain [FreeTextEntry1] : 71 yo morbidly obese female distant smoker (DC in 1982) seen prior to afib ablation due to possible DUSTY.  \par Chronic stable moderate 1 flight BOLDEN relieved with 3 minutes of rest\par at baseline\par no fever, chills, chest pain\par no purulent sputum\par Told of desat on CPAP in hospital in 2017\par \par 4/14/20\par Using CPAP faithfully\par Refreshed each am.

## 2020-04-14 NOTE — ASSESSMENT
[FreeTextEntry1] : Severe DUSTY\par Could contribute to a-fib recurrence risk\par Compliant with and benefiting from nocturnal full face autopap (7-15 cm H20 via [place] view FFM).

## 2020-04-14 NOTE — REVIEW OF SYSTEMS
[Recent Wt Gain (___ Lbs)] : recent [unfilled] ~Ulb weight gain [Hypertension] : ~T hypertension [Nocturia] : nocturia [As Noted in HPI] : as noted in HPI [Negative] : Endocrine [FreeTextEntry9] : Atrial fibrilation [de-identified] : Mild - secondary

## 2020-05-05 ENCOUNTER — APPOINTMENT (OUTPATIENT)
Dept: PULMONOLOGY | Facility: CLINIC | Age: 75
End: 2020-05-05

## 2020-09-18 NOTE — DISCUSSION/SUMMARY
[de-identified] : 73 year old female present in the office s/p bilateral TKA. Xrays were reviewed and the patient was reassured that their TKA components are in good position, with no signs of loosening or wear. I advised her to f/u with local wound for her L lateral laceration care and monitor as directed. The patient is doing well at this time, no further issues have been identified. Patient will call if they have any further issues. Follow up in 1 year.
None known

## 2020-10-07 NOTE — H&P PST ADULT - PRO TOBACCO TYPE
Future Appointments   Date Time Provider Pratima Corral   10/8/2020 11:00 AM Liang Lemus, DO [facility] [address] [facility] [address]     Patient verbally consents to a virtual/telephone check-in service for the date and time noted above.  Patient understands and accept
cigarettes

## 2020-10-14 ENCOUNTER — APPOINTMENT (OUTPATIENT)
Dept: PULMONOLOGY | Facility: CLINIC | Age: 75
End: 2020-10-14
Payer: MEDICARE

## 2020-10-14 VITALS — SYSTOLIC BLOOD PRESSURE: 140 MMHG | WEIGHT: 280 LBS | BODY MASS INDEX: 48.06 KG/M2 | DIASTOLIC BLOOD PRESSURE: 80 MMHG

## 2020-10-14 VITALS — HEART RATE: 60 BPM | OXYGEN SATURATION: 95 % | RESPIRATION RATE: 16 BRPM

## 2020-10-14 PROCEDURE — 99214 OFFICE O/P EST MOD 30 MIN: CPT

## 2020-10-14 RX ORDER — VANCOMYCIN HYDROCHLORIDE 125 MG/1
125 CAPSULE ORAL
Qty: 56 | Refills: 0 | Status: COMPLETED | COMMUNITY
Start: 2019-07-08 | End: 2020-10-14

## 2020-10-14 RX ORDER — VANCOMYCIN HYDROCHLORIDE 125 MG/1
125 CAPSULE ORAL
Qty: 56 | Refills: 0 | Status: COMPLETED | COMMUNITY
Start: 2019-04-25 | End: 2020-10-14

## 2020-10-14 RX ORDER — VANCOMYCIN HYDROCHLORIDE 125 MG/1
125 CAPSULE ORAL
Qty: 56 | Refills: 0 | Status: COMPLETED | COMMUNITY
Start: 2019-05-10 | End: 2020-10-14

## 2020-10-14 NOTE — ASSESSMENT
[FreeTextEntry1] : Severe DUSTY\par Could contribute to a-fib recurrence risk\par Compliant with and benefiting from nocturnal full face autopap (7-15 cm H20 via medium [place] view FFM).

## 2020-10-14 NOTE — COUNSELING
[Benefits of weight loss discussed] : Benefits of weight loss discussed [Potential consequences of obesity discussed] : Potential consequences of obesity discussed [Encouraged to maintain food diary] : Encouraged to maintain food diary [Structured Weight Management Program suggested:] : Structured weight management program suggested [Encouraged to increase physical activity] : Encouraged to increase physical activity [Encouraged to use exercise tracking device] : Encouraged to use exercise tracking device

## 2020-10-14 NOTE — HISTORY OF PRESENT ILLNESS
[FreeTextEntry1] : 71 yo morbidly obese female distant smoker (DC in 1982) seen prior to afib ablation due to possible DUSTY.  \par Chronic stable moderate 1 flight BOLDEN relieved with 3 minutes of rest\par at baseline\par no fever, chills, chest pain\par no purulent sputum\par Told of desat on CPAP in hospital in 2017\par \par 4/14/20\par Using CPAP faithfully\par Refreshed each am.

## 2020-10-14 NOTE — PHYSICAL EXAM
[Normal Conjunctiva] : the conjunctiva exhibited no abnormalities [Enlarged Base of the Tongue] : enlargement of the base of the tongue [Neck Appearance] : the appearance of the neck was normal [Jugular Venous Distention Increased] : there was no jugular-venous distention [Neck Cervical Mass (___cm)] : no neck mass was observed [Thyroid Diffuse Enlargement] : the thyroid was not enlarged [Respiration, Rhythm And Depth] : normal respiratory rhythm and effort [Auscultation Breath Sounds / Voice Sounds] : lungs were clear to auscultation bilaterally [Lungs Percussion] : the lungs were normal to percussion [Abdomen Soft] : soft [Bowel Sounds] : normal bowel sounds [Abdomen Tenderness] : non-tender [Nail Clubbing] : no clubbing of the fingernails [Abnormal Walk] : normal gait [Cyanosis, Localized] : no localized cyanosis [No Focal Deficits] : no focal deficits [Oriented To Time, Place, And Person] : oriented to person, place, and time [Affect] : the affect was normal [Impaired Insight] : insight and judgment were intact [Memory Recent] : recent memory was not impaired [Skin Turgor] : normal skin turgor [] : no rash [FreeTextEntry1] : Irreg-irreg

## 2020-10-14 NOTE — CONSULT LETTER
[Dear  ___] : Dear  [unfilled], [Consult Letter:] : I had the pleasure of evaluating your patient, [unfilled]. [Please see my note below.] : Please see my note below. [Consult Closing:] : Thank you very much for allowing me to participate in the care of this patient.  If you have any questions, please do not hesitate to contact me. [DrMohamud ___] : Dr. PAZ [DrMohamud  ___] : Dr. PAZ [Sincerely,] : Sincerely, [facility] : [facility] [Director, Respiratory Care] : Director, Respiratory Care [[facility]] : [facility] [Liban Magana MD] : Liban Magana MD

## 2020-10-14 NOTE — REVIEW OF SYSTEMS
[Recent Wt Gain (___ Lbs)] : recent [unfilled] ~Ulb weight gain [Nocturia] : nocturia [Hypertension] : ~T hypertension [As Noted in HPI] : as noted in HPI [Negative] : Psychiatric [FreeTextEntry9] : Atrial fibrilation [de-identified] : Mild - secondary

## 2020-10-19 ENCOUNTER — APPOINTMENT (OUTPATIENT)
Dept: DERMATOLOGY | Facility: CLINIC | Age: 75
End: 2020-10-19
Payer: MEDICARE

## 2020-10-19 PROCEDURE — 99213 OFFICE O/P EST LOW 20 MIN: CPT

## 2021-04-15 ENCOUNTER — APPOINTMENT (OUTPATIENT)
Dept: PULMONOLOGY | Facility: CLINIC | Age: 76
End: 2021-04-15
Payer: MEDICARE

## 2021-04-15 VITALS — WEIGHT: 280 LBS | BODY MASS INDEX: 48.06 KG/M2

## 2021-04-15 VITALS — RESPIRATION RATE: 16 BRPM | HEART RATE: 61 BPM | OXYGEN SATURATION: 96 %

## 2021-04-15 PROCEDURE — 99213 OFFICE O/P EST LOW 20 MIN: CPT

## 2021-04-15 RX ORDER — SODIUM SULFATE, POTASSIUM SULFATE, MAGNESIUM SULFATE 17.5; 3.13; 1.6 G/ML; G/ML; G/ML
17.5-3.13-1.6 SOLUTION, CONCENTRATE ORAL
Qty: 1 | Refills: 0 | Status: COMPLETED | COMMUNITY
Start: 2019-10-22 | End: 2021-04-15

## 2021-04-15 RX ORDER — NITROFURANTOIN (MONOHYDRATE/MACROCRYSTALS) 25; 75 MG/1; MG/1
100 CAPSULE ORAL
Refills: 0 | Status: COMPLETED | COMMUNITY
End: 2021-04-15

## 2021-04-15 NOTE — REVIEW OF SYSTEMS
[Recent Wt Gain (___ Lbs)] : recent [unfilled] ~Ulb weight gain [Hypertension] : ~T hypertension [Nocturia] : nocturia [As Noted in HPI] : as noted in HPI [Negative] : Endocrine [FreeTextEntry9] : Atrial fibrilation [de-identified] : Mild - secondary

## 2021-04-15 NOTE — PHYSICAL EXAM
[Normal Conjunctiva] : the conjunctiva exhibited no abnormalities [Enlarged Base of the Tongue] : enlargement of the base of the tongue [Neck Appearance] : the appearance of the neck was normal [Neck Cervical Mass (___cm)] : no neck mass was observed [Jugular Venous Distention Increased] : there was no jugular-venous distention [Thyroid Diffuse Enlargement] : the thyroid was not enlarged [Respiration, Rhythm And Depth] : normal respiratory rhythm and effort [Auscultation Breath Sounds / Voice Sounds] : lungs were clear to auscultation bilaterally [Lungs Percussion] : the lungs were normal to percussion [Bowel Sounds] : normal bowel sounds [Abdomen Soft] : soft [Abdomen Tenderness] : non-tender [Abnormal Walk] : normal gait [Nail Clubbing] : no clubbing of the fingernails [Cyanosis, Localized] : no localized cyanosis [No Focal Deficits] : no focal deficits [Oriented To Time, Place, And Person] : oriented to person, place, and time [Impaired Insight] : insight and judgment were intact [Affect] : the affect was normal [Memory Recent] : recent memory was not impaired [Skin Turgor] : normal skin turgor [] : no rash [FreeTextEntry1] : Irreg-irreg

## 2021-04-15 NOTE — CONSULT LETTER
[Dear  ___] : Dear  [unfilled], [Consult Letter:] : I had the pleasure of evaluating your patient, [unfilled]. [Please see my note below.] : Please see my note below. [Consult Closing:] : Thank you very much for allowing me to participate in the care of this patient.  If you have any questions, please do not hesitate to contact me. [Sincerely,] : Sincerely, [DrMohamud  ___] : Dr. PAZ [DrMohamud ___] : Dr. PAZ [[facility], [facility]] : [facility], [facility] [Director, Respiratory Care] : Director, Respiratory Care [[facility]] : [facility] [Liban Magana MD] : Liban Magana MD

## 2021-04-15 NOTE — HISTORY OF PRESENT ILLNESS
[FreeTextEntry1] : 69 yo morbidly obese female distant smoker (DC in 1982) seen prior to afib ablation due to possible DUSTY.  \par Chronic stable moderate 1 flight BOLDEN relieved with 3 minutes of rest\par at baseline\par no fever, chills, chest pain\par no purulent sputum\par Told of desat on CPAP in hospital in 2017\par \par 4/14/20\par Using CPAP faithfully\par Refreshed each am. \par \par 4/15/21\par Using CPAP faithfully for 6-8 hours every night\par Refreshed each am. \par Machine starting to be a little more noisy

## 2021-10-13 ENCOUNTER — APPOINTMENT (OUTPATIENT)
Dept: PULMONOLOGY | Facility: CLINIC | Age: 76
End: 2021-10-13
Payer: MEDICARE

## 2021-10-13 VITALS
OXYGEN SATURATION: 92 % | SYSTOLIC BLOOD PRESSURE: 140 MMHG | DIASTOLIC BLOOD PRESSURE: 70 MMHG | HEART RATE: 109 BPM | BODY MASS INDEX: 48.06 KG/M2 | WEIGHT: 280 LBS

## 2021-10-13 DIAGNOSIS — Z23 ENCOUNTER FOR IMMUNIZATION: ICD-10-CM

## 2021-10-13 PROCEDURE — G0008: CPT

## 2021-10-13 PROCEDURE — 90662 IIV NO PRSV INCREASED AG IM: CPT

## 2021-10-13 PROCEDURE — 99213 OFFICE O/P EST LOW 20 MIN: CPT | Mod: 25

## 2021-10-13 NOTE — REVIEW OF SYSTEMS
[Recent Wt Gain (___ Lbs)] : recent [unfilled] ~Ulb weight gain [Hypertension] : ~T hypertension [Nocturia] : nocturia [As Noted in HPI] : as noted in HPI [Negative] : Endocrine [FreeTextEntry9] : Atrial fibrilation [de-identified] : Mild - secondary

## 2021-10-13 NOTE — CONSULT LETTER
[Dear  ___] : Dear  [unfilled], [Consult Letter:] : I had the pleasure of evaluating your patient, [unfilled]. [Please see my note below.] : Please see my note below. [Consult Closing:] : Thank you very much for allowing me to participate in the care of this patient.  If you have any questions, please do not hesitate to contact me. [Sincerely,] : Sincerely, [DrMohamud  ___] : Dr. PAZ [DrMohamud ___] : Dr. PAZ [[facility], [facility] : [facility], [facility] [Director, Respiratory Care] : Director, Respiratory Care [[facility]] : [facility] [Liban Magana MD] : Liban Magana MD

## 2021-10-13 NOTE — HISTORY OF PRESENT ILLNESS
[FreeTextEntry1] : 69 yo morbidly obese female distant smoker (DC in 1982) seen prior to afib ablation due to possible DUSTY.  \par Chronic stable moderate 1 flight BOLDEN relieved with 3 minutes of rest\par at baseline\par no fever, chills, chest pain\par no purulent sputum\par Told of desat on CPAP in hospital in 2017\par \par 4/14/20\par Using CPAP faithfully\par Refreshed each am. \par \par 4/15/21\par Using CPAP faithfully for 6-8 hours every night\par Refreshed each am. \par Machine starting to be a little more noisy \par \par 10/13/21\par Using CPAP faithfully for 6-8 hours every night\par Refreshed each am. \par Machine starting to be a little more noisy

## 2021-10-21 ENCOUNTER — RESULT REVIEW (OUTPATIENT)
Age: 76
End: 2021-10-21

## 2021-10-21 ENCOUNTER — APPOINTMENT (OUTPATIENT)
Dept: DERMATOLOGY | Facility: CLINIC | Age: 76
End: 2021-10-21
Payer: MEDICARE

## 2021-10-21 PROCEDURE — 11102 TANGNTL BX SKIN SINGLE LES: CPT

## 2021-10-21 PROCEDURE — 99213 OFFICE O/P EST LOW 20 MIN: CPT | Mod: 25

## 2021-11-17 ENCOUNTER — APPOINTMENT (OUTPATIENT)
Dept: DERMATOLOGY | Facility: CLINIC | Age: 76
End: 2021-11-17
Payer: MEDICARE

## 2021-11-17 DIAGNOSIS — C44.519 BASAL CELL CARCINOMA OF SKIN OF OTHER PART OF TRUNK: ICD-10-CM

## 2021-11-17 PROCEDURE — 17314 MOHS ADDL STAGE T/A/L: CPT

## 2021-11-17 PROCEDURE — 17313 MOHS 1 STAGE T/A/L: CPT

## 2021-11-17 PROCEDURE — 13101 CMPLX RPR TRUNK 2.6-7.5 CM: CPT

## 2021-11-19 PROBLEM — C44.519 BCC (BASAL CELL CARCINOMA), CHEST: Status: ACTIVE | Noted: 2021-11-17

## 2022-04-04 ENCOUNTER — APPOINTMENT (OUTPATIENT)
Dept: PULMONOLOGY | Facility: CLINIC | Age: 77
End: 2022-04-04
Payer: MEDICARE

## 2022-04-04 VITALS
HEART RATE: 60 BPM | BODY MASS INDEX: 47.8 KG/M2 | RESPIRATION RATE: 16 BRPM | OXYGEN SATURATION: 97 % | WEIGHT: 280 LBS | DIASTOLIC BLOOD PRESSURE: 68 MMHG | SYSTOLIC BLOOD PRESSURE: 120 MMHG | HEIGHT: 64 IN

## 2022-04-04 DIAGNOSIS — R53.81 OTHER MALAISE: ICD-10-CM

## 2022-04-04 PROCEDURE — 99213 OFFICE O/P EST LOW 20 MIN: CPT

## 2022-04-04 RX ORDER — MUPIROCIN 20 MG/G
2 OINTMENT TOPICAL TWICE DAILY
Qty: 1 | Refills: 2 | Status: ACTIVE | COMMUNITY
Start: 2021-11-17

## 2022-04-04 RX ORDER — OXYBUTYNIN CHLORIDE 5 MG/1
5 TABLET, EXTENDED RELEASE ORAL
Qty: 90 | Refills: 0 | Status: ACTIVE | COMMUNITY
Start: 2022-01-11

## 2022-04-04 NOTE — CONSULT LETTER
[Dear  ___] : Dear  [unfilled], [Consult Letter:] : I had the pleasure of evaluating your patient, [unfilled]. [Please see my note below.] : Please see my note below. [Consult Closing:] : Thank you very much for allowing me to participate in the care of this patient.  If you have any questions, please do not hesitate to contact me. [Sincerely,] : Sincerely, [DrMohamud  ___] : Dr. PAZ [DrMohamud ___] : Dr. PAZ [[facility], [facility]] : Db [facility] DO, [facility] [Director, Respiratory Care] : Director, Respiratory Care [[facility]] : [facility] [Liban Magana MD] : Liban Magana MD

## 2022-04-21 ENCOUNTER — APPOINTMENT (OUTPATIENT)
Dept: DERMATOLOGY | Facility: CLINIC | Age: 77
End: 2022-04-21
Payer: MEDICARE

## 2022-04-21 PROCEDURE — 99213 OFFICE O/P EST LOW 20 MIN: CPT | Mod: 25

## 2022-04-21 PROCEDURE — 17000 DESTRUCT PREMALG LESION: CPT

## 2022-08-04 ENCOUNTER — APPOINTMENT (OUTPATIENT)
Dept: PULMONOLOGY | Facility: CLINIC | Age: 77
End: 2022-08-04

## 2022-10-20 ENCOUNTER — APPOINTMENT (OUTPATIENT)
Dept: DERMATOLOGY | Facility: CLINIC | Age: 77
End: 2022-10-20

## 2022-10-20 PROCEDURE — 99213 OFFICE O/P EST LOW 20 MIN: CPT

## 2022-10-20 NOTE — HISTORY OF PRESENT ILLNESS
[FreeTextEntry1] : 69 yo morbidly obese female distant smoker (DC in 1982) seen prior to afib ablation due to possible DUSTY.  \par Chronic stable moderate 1 flight BOLDEN relieved with 3 minutes of rest\par at baseline\par no fever, chills, chest pain\par no purulent sputum\par Told of desat on CPAP in hospital in 2017\par \par 4/14/20\par Using CPAP faithfully\par Refreshed each am. \par \par 4/15/21\par Using CPAP faithfully for 6-8 hours every night\par Refreshed each am. \par Machine starting to be a little more noisy \par \par 10/13/21\par Using CPAP faithfully for 6-8 hours every night\par Refreshed each am. \par Machine starting to be a little more noisy \par \par 4/4/22\par Compliant with and benefiting from nocturnal CPAP\par CPAP machine old and failing

## 2022-10-20 NOTE — REVIEW OF SYSTEMS
[Recent Wt Gain (___ Lbs)] : recent [unfilled] ~Ulb weight gain [Hypertension] : ~T hypertension [Nocturia] : nocturia [As Noted in HPI] : as noted in HPI [Negative] : Endocrine [FreeTextEntry9] : Atrial fibrilation [de-identified] : Mild - secondary

## 2022-10-21 ENCOUNTER — APPOINTMENT (OUTPATIENT)
Dept: PULMONOLOGY | Facility: CLINIC | Age: 77
End: 2022-10-21

## 2022-10-21 VITALS
RESPIRATION RATE: 16 BRPM | HEART RATE: 58 BPM | HEIGHT: 64 IN | BODY MASS INDEX: 47.8 KG/M2 | WEIGHT: 280 LBS | OXYGEN SATURATION: 97 % | DIASTOLIC BLOOD PRESSURE: 74 MMHG | SYSTOLIC BLOOD PRESSURE: 116 MMHG

## 2022-10-21 DIAGNOSIS — I27.20 PULMONARY HYPERTENSION, UNSPECIFIED: ICD-10-CM

## 2022-10-21 PROCEDURE — 99213 OFFICE O/P EST LOW 20 MIN: CPT

## 2022-10-21 NOTE — REVIEW OF SYSTEMS
[Recent Wt Gain (___ Lbs)] : recent [unfilled] ~Ulb weight gain [Hypertension] : ~T hypertension [Nocturia] : nocturia [As Noted in HPI] : as noted in HPI [Negative] : Endocrine [FreeTextEntry9] : Atrial fibrilation [de-identified] : Mild - secondary

## 2022-10-21 NOTE — ASSESSMENT
[FreeTextEntry1] : Severe DUSTY\par Could contribute to a-fib recurrence risk\par Compliant with and benefiting from nocturnal full face autopap (7-15 cm H20 via medium [place] view FFM).\par Inflammatory pulmonary nodule decreasing in size after 2 weeks

## 2022-10-21 NOTE — HISTORY OF PRESENT ILLNESS
[FreeTextEntry1] : 71 yo morbidly obese female distant smoker (DC in 1982) seen prior to afib ablation due to possible DUSTY.  \par Chronic stable moderate 1 flight BOLDEN relieved with 3 minutes of rest\par at baseline\par no fever, chills, chest pain\par no purulent sputum\par Told of desat on CPAP in hospital in 2017\par \par 4/14/20\par Using CPAP faithfully\par Refreshed each am. \par \par 4/15/21\par Using CPAP faithfully for 6-8 hours every night\par Refreshed each am. \par Machine starting to be a little more noisy \par \par 10/13/21\par Using CPAP faithfully for 6-8 hours every night\par Refreshed each am. \par Machine starting to be a little more noisy \par \par 4/4/22\par Compliant with and benefiting from nocturnal CPAP\par CPAP machine old and failing\par \par 10/21/22\par Elevated PA pressures in Early October of 2022\par Sent for CTA by Dr Dooley - No PE; 9mm peripheral RML nodule on 10/4/22\par PET CT on 10/17/22 - Nodule decreased from 9X8 to 6X4 mm and not hypermetabolic\par Just got APAP - doing well - as usual

## 2022-10-21 NOTE — CONSULT LETTER
[Dear  ___] : Dear  [unfilled], [Consult Letter:] : I had the pleasure of evaluating your patient, [unfilled]. [Please see my note below.] : Please see my note below. [Consult Closing:] : Thank you very much for allowing me to participate in the care of this patient.  If you have any questions, please do not hesitate to contact me. [Sincerely,] : Sincerely, [DrMohamud  ___] : Dr. PAZ [DrMohamud ___] : Dr. PAZ [[facility] [Director, Respiratory Care] : Director, Respiratory Care [[facility]] : [facility] [Liban Magana MD] : Liban Magana MD

## 2023-04-14 ENCOUNTER — APPOINTMENT (OUTPATIENT)
Dept: DERMATOLOGY | Facility: CLINIC | Age: 78
End: 2023-04-14

## 2023-05-08 ENCOUNTER — RESULT CHARGE (OUTPATIENT)
Age: 78
End: 2023-05-08

## 2023-05-08 ENCOUNTER — APPOINTMENT (OUTPATIENT)
Dept: OBGYN | Facility: CLINIC | Age: 78
End: 2023-05-08
Payer: MEDICARE

## 2023-05-08 VITALS
HEIGHT: 64 IN | BODY MASS INDEX: 44.22 KG/M2 | DIASTOLIC BLOOD PRESSURE: 70 MMHG | WEIGHT: 259 LBS | SYSTOLIC BLOOD PRESSURE: 116 MMHG

## 2023-05-08 DIAGNOSIS — N39.0 URINARY TRACT INFECTION, SITE NOT SPECIFIED: ICD-10-CM

## 2023-05-08 DIAGNOSIS — Z00.00 ENCOUNTER FOR GENERAL ADULT MEDICAL EXAMINATION W/OUT ABNORMAL FINDINGS: ICD-10-CM

## 2023-05-08 PROCEDURE — 99397 PER PM REEVAL EST PAT 65+ YR: CPT

## 2023-05-08 PROCEDURE — 99204 OFFICE O/P NEW MOD 45 MIN: CPT | Mod: 25

## 2023-05-08 RX ORDER — FLUCONAZOLE 150 MG/1
150 TABLET ORAL
Qty: 4 | Refills: 0 | Status: ACTIVE | COMMUNITY
Start: 2023-05-08 | End: 1900-01-01

## 2023-05-08 NOTE — PHYSICAL EXAM
[Appropriately responsive] : appropriately responsive [Alert] : alert [No Acute Distress] : no acute distress [No Lymphadenopathy] : no lymphadenopathy [Regular Rate Rhythm] : regular rate rhythm [No Murmurs] : no murmurs [Clear to Auscultation B/L] : clear to auscultation bilaterally [Soft] : soft [Non-tender] : non-tender [Non-distended] : non-distended [No HSM] : No HSM [No Lesions] : no lesions [No Mass] : no mass [Oriented x3] : oriented x3 [Examination Of The Breasts] : a normal appearance [No Masses] : no breast masses were palpable [Vulvitis] : vulvitis [Labia Majora] : normal [Labia Minora] : normal [Atrophy] : atrophy [Normal] : normal [Uterine Adnexae] : normal

## 2023-05-09 PROBLEM — N39.0 ACUTE UTI: Status: RESOLVED | Noted: 2023-05-09 | Resolved: 2023-06-08

## 2023-05-09 NOTE — HISTORY OF PRESENT ILLNESS
[FreeTextEntry1] : 78 yo pt had vaginitis , tried otc cream which helped (but burned0, still getting some intermittent itching and burning. \par dexd w pulm htn, on mult new meds helping. \par has [place] cruise coming up, hoping to not have yeast inf. \[facility] and yeast ing. \par ua showed blood in urine. \par uses premarin cream pv for recurrent utis. UA + today, some dysuria.\par has hx of cdiff, typically if she needs abxs for uti also takes  vancomycin

## 2023-05-09 NOTE — PLAN
[FreeTextEntry1] : vulvitis- lotrisone prescribed, diflucan prescribed. pt concerned because she is doing a [place]\par u cx sent out.\par call w results. \par freq utis- cont premarin cream. \par

## 2023-05-10 LAB
BACTERIA UR CULT: NORMAL
BILIRUB UR QL STRIP: NORMAL
CLARITY UR: CLEAR
COLLECTION METHOD: NORMAL
GLUCOSE UR-MCNC: 1000
HCG UR QL: 0.2 EU/DL
HGB UR QL STRIP.AUTO: NORMAL
KETONES UR-MCNC: NORMAL
LEUKOCYTE ESTERASE UR QL STRIP: NORMAL
NITRITE UR QL STRIP: NORMAL
PH UR STRIP: 5.5
PROT UR STRIP-MCNC: NORMAL
SP GR UR STRIP: 1.01

## 2023-05-15 LAB
BACTERIA GENITAL AEROBE CULT: NORMAL
CYTOLOGY CVX/VAG DOC THIN PREP: ABNORMAL
HPV HIGH+LOW RISK DNA PNL CVX: DETECTED

## 2023-06-05 ENCOUNTER — APPOINTMENT (OUTPATIENT)
Dept: DERMATOLOGY | Facility: CLINIC | Age: 78
End: 2023-06-05
Payer: MEDICARE

## 2023-06-05 PROCEDURE — 99213 OFFICE O/P EST LOW 20 MIN: CPT

## 2023-06-15 NOTE — CONSULT NOTE ADULT - ASSESSMENT
How Severe Are Your Spot(S)?: moderate What Type Of Note Output Would You Prefer (Optional)?: Bullet Format What Is The Reason For Today's Visit?: Full Body Skin Examination What Is The Reason For Today's Visit? (Being Monitored For X): the risk of recurrence of previously treated lesion(s) A  Post op and obesity related atelectasis with shunt  Nothing to suggest PE (95% of PE has dyspnea at rest), despite one day without xarelto and low dosing of xarelto briefly post op. (Significant VTE would take longer to develop)  Rec  Ambulation  Incentive spirometry and deep breathing encouraged  May be discharged from pulmonary perspective  O/P pulmonary FU in 2-3 weeks; Can be with our NP, Sharlene Burnette

## 2023-07-10 ENCOUNTER — APPOINTMENT (OUTPATIENT)
Dept: OBGYN | Facility: CLINIC | Age: 78
End: 2023-07-10
Payer: MEDICARE

## 2023-07-10 VITALS
BODY MASS INDEX: 43.54 KG/M2 | HEIGHT: 64 IN | WEIGHT: 255 LBS | DIASTOLIC BLOOD PRESSURE: 68 MMHG | SYSTOLIC BLOOD PRESSURE: 104 MMHG

## 2023-07-10 PROCEDURE — 57454 BX/CURETT OF CERVIX W/SCOPE: CPT

## 2023-07-13 NOTE — HISTORY OF PRESENT ILLNESS
[FreeTextEntry1] : pt here to fu abnl pap. had ascus hpv+. reports hx colposcopy about 20 yrs ago, no surgery needed. \par vaginitis improved. using vag estrogens

## 2023-07-13 NOTE — PROCEDURE
[Colposcopy] : Colposcopy  [Time out performed] : Pre-procedure time out performed.  Patient's name, date of birth and procedure confirmed. [Consent Obtained] : Consent obtained [Risks] : risks [Benefits] : benefits [Alternatives] : alternatives [Patient] : patient [Infection] : infection [Bleeding] : bleeding [Allergic Reaction] : allergic reaction [ASCUS] : ASCUS [HPV High Risk] : HPV high risk [Colposcopy Adequate] : colposcopy adequate [SCI Fully Visualized] : SCI fully visualized [ECC Performed] : ECC performed [Biopsy] : biopsy taken [Hemostasis Obtained] : Hemostasis obtained [Tolerated Well] : the patient tolerated the procedure well [de-identified] : 1 [de-identified] : 1 [de-identified] : 11 [de-identified] : awe

## 2023-07-21 LAB — CORE LAB BIOPSY: NORMAL

## 2023-07-24 ENCOUNTER — APPOINTMENT (OUTPATIENT)
Dept: OBGYN | Facility: CLINIC | Age: 78
End: 2023-07-24
Payer: MEDICARE

## 2023-07-24 VITALS
DIASTOLIC BLOOD PRESSURE: 80 MMHG | WEIGHT: 256 LBS | SYSTOLIC BLOOD PRESSURE: 124 MMHG | BODY MASS INDEX: 43.71 KG/M2 | HEIGHT: 64 IN

## 2023-07-24 DIAGNOSIS — N76.2 ACUTE VULVITIS: ICD-10-CM

## 2023-07-24 DIAGNOSIS — B97.7 PAPILLOMAVIRUS AS THE CAUSE OF DISEASES CLASSIFIED ELSEWHERE: ICD-10-CM

## 2023-07-24 DIAGNOSIS — L08.9 LOCAL INFECTION OF THE SKIN AND SUBCUTANEOUS TISSUE, UNSPECIFIED: ICD-10-CM

## 2023-07-24 PROCEDURE — 99214 OFFICE O/P EST MOD 30 MIN: CPT

## 2023-07-24 RX ORDER — CLOTRIMAZOLE AND BETAMETHASONE DIPROPIONATE 10; .5 MG/G; MG/G
1-0.05 CREAM TOPICAL TWICE DAILY
Qty: 1 | Refills: 1 | Status: ACTIVE | COMMUNITY
Start: 2023-05-08 | End: 1900-01-01

## 2023-07-24 NOTE — PLAN
[FreeTextEntry1] : lotrisone for itching prn.\par tca to cervix. lgsil, dw pt mylene hx hpv and treatments available. spent 30 min in encounter.

## 2023-07-24 NOTE — HISTORY OF PRESENT ILLNESS
[FreeTextEntry1] : pt had ascus hpv+ pap, colpo bx showed lgsil. \par uses lotrisone every few days for itching.

## 2023-10-03 NOTE — HISTORY OF PRESENT ILLNESS
Called patient to schedule with Dr. Miller. No answer and unable to leave message mailbox is full   [Pain Location] : pain [de-identified] : Patient is a 73 year old female who presents for bilateral TKA follow up \par Left 3/2017 no pain, has trouble with stairs, no buckling, locking, clicking or swelling \par Right knee 2013 no pain, has trouble with stairs, no buckling, locking, clicking or swelling \par \par overall doing well no complaints

## 2023-10-25 ENCOUNTER — APPOINTMENT (OUTPATIENT)
Dept: PULMONOLOGY | Facility: CLINIC | Age: 78
End: 2023-10-25
Payer: MEDICARE

## 2023-10-25 VITALS
WEIGHT: 260 LBS | BODY MASS INDEX: 44.39 KG/M2 | HEIGHT: 64 IN | DIASTOLIC BLOOD PRESSURE: 69 MMHG | SYSTOLIC BLOOD PRESSURE: 128 MMHG | OXYGEN SATURATION: 97 % | RESPIRATION RATE: 16 BRPM | HEART RATE: 64 BPM

## 2023-10-25 DIAGNOSIS — G47.33 OBSTRUCTIVE SLEEP APNEA (ADULT) (PEDIATRIC): ICD-10-CM

## 2023-10-25 DIAGNOSIS — R91.1 SOLITARY PULMONARY NODULE: ICD-10-CM

## 2023-10-25 DIAGNOSIS — E66.01 MORBID (SEVERE) OBESITY DUE TO EXCESS CALORIES: ICD-10-CM

## 2023-10-25 PROCEDURE — 99213 OFFICE O/P EST LOW 20 MIN: CPT

## 2023-10-25 RX ORDER — FUROSEMIDE 80 MG/1
TABLET ORAL
Refills: 0 | Status: ACTIVE | COMMUNITY

## 2023-10-25 RX ORDER — CETIRIZINE HYDROCHLORIDE 10 MG/1
CAPSULE, LIQUID FILLED ORAL
Refills: 0 | Status: ACTIVE | COMMUNITY

## 2023-10-25 RX ORDER — SACUBITRIL AND VALSARTAN 49; 51 MG/1; MG/1
TABLET, FILM COATED ORAL
Refills: 0 | Status: ACTIVE | COMMUNITY

## 2023-10-25 RX ORDER — DAPAGLIFLOZIN 10 MG/1
TABLET, FILM COATED ORAL
Refills: 0 | Status: ACTIVE | COMMUNITY

## 2023-12-05 ENCOUNTER — APPOINTMENT (OUTPATIENT)
Dept: DERMATOLOGY | Facility: CLINIC | Age: 78
End: 2023-12-05
Payer: MEDICARE

## 2023-12-05 PROCEDURE — 99213 OFFICE O/P EST LOW 20 MIN: CPT

## 2024-02-21 NOTE — CONSULT LETTER
Spoke to patient and notified of message. Good verb was given.    [Dear  ___] : Dear  [unfilled], [Consult Letter:] : I had the pleasure of evaluating your patient, [unfilled]. [Please see my note below.] : Please see my note below. [Consult Closing:] : Thank you very much for allowing me to participate in the care of this patient.  If you have any questions, please do not hesitate to contact me. [Sincerely,] : Sincerely, [DrMohamud  ___] : Dr. PAZ [DrMohamud ___] : Dr. PAZ [facility] [Director, Respiratory Care] : Director, Respiratory Care [[facility]] : [facility] [Liban Magana MD] : Liban Magana MD

## 2024-04-01 NOTE — H&P PST ADULT - HEIGHT IN CM
162.56 Detail Level: Zone Photo Preface (Leave Blank If You Do Not Want): Photographs were obtained today

## 2024-06-17 ENCOUNTER — APPOINTMENT (OUTPATIENT)
Dept: OBGYN | Facility: CLINIC | Age: 79
End: 2024-06-17

## 2024-08-12 ENCOUNTER — APPOINTMENT (OUTPATIENT)
Dept: DERMATOLOGY | Facility: CLINIC | Age: 79
End: 2024-08-12

## 2024-09-20 ENCOUNTER — APPOINTMENT (OUTPATIENT)
Dept: DERMATOLOGY | Facility: CLINIC | Age: 79
End: 2024-09-20
Payer: MEDICARE

## 2024-09-20 PROCEDURE — 99213 OFFICE O/P EST LOW 20 MIN: CPT | Mod: 25

## 2024-09-20 PROCEDURE — 11102 TANGNTL BX SKIN SINGLE LES: CPT

## 2024-10-07 ENCOUNTER — APPOINTMENT (OUTPATIENT)
Dept: OBGYN | Facility: CLINIC | Age: 79
End: 2024-10-07
Payer: MEDICARE

## 2024-10-07 VITALS
WEIGHT: 263 LBS | SYSTOLIC BLOOD PRESSURE: 116 MMHG | BODY MASS INDEX: 44.9 KG/M2 | HEIGHT: 64 IN | DIASTOLIC BLOOD PRESSURE: 68 MMHG

## 2024-10-07 DIAGNOSIS — R87.612 LOW GRADE SQUAMOUS INTRAEPITHELIAL LESION ON CYTOLOGIC SMEAR OF CERVIX (LGSIL): ICD-10-CM

## 2024-10-07 DIAGNOSIS — B37.31 ACUTE CANDIDIASIS OF VULVA AND VAGINA: ICD-10-CM

## 2024-10-07 PROCEDURE — 99214 OFFICE O/P EST MOD 30 MIN: CPT

## 2024-10-14 LAB — CYTOLOGY CVX/VAG DOC THIN PREP: NORMAL

## 2024-10-16 ENCOUNTER — APPOINTMENT (OUTPATIENT)
Dept: PULMONOLOGY | Facility: CLINIC | Age: 79
End: 2024-10-16
Payer: MEDICARE

## 2024-10-16 VITALS
HEART RATE: 61 BPM | HEIGHT: 64 IN | RESPIRATION RATE: 16 BRPM | WEIGHT: 263 LBS | BODY MASS INDEX: 44.9 KG/M2 | SYSTOLIC BLOOD PRESSURE: 116 MMHG | DIASTOLIC BLOOD PRESSURE: 70 MMHG | OXYGEN SATURATION: 97 %

## 2024-10-16 DIAGNOSIS — E66.01 MORBID (SEVERE) OBESITY DUE TO EXCESS CALORIES: ICD-10-CM

## 2024-10-16 DIAGNOSIS — G47.33 OBSTRUCTIVE SLEEP APNEA (ADULT) (PEDIATRIC): ICD-10-CM

## 2024-10-16 DIAGNOSIS — I27.20 PULMONARY HYPERTENSION, UNSPECIFIED: ICD-10-CM

## 2024-10-16 PROCEDURE — G2211 COMPLEX E/M VISIT ADD ON: CPT

## 2024-10-16 PROCEDURE — 99213 OFFICE O/P EST LOW 20 MIN: CPT

## 2024-11-12 ENCOUNTER — APPOINTMENT (OUTPATIENT)
Dept: DERMATOLOGY | Facility: CLINIC | Age: 79
End: 2024-11-12
Payer: MEDICARE

## 2024-11-12 PROCEDURE — 99213 OFFICE O/P EST LOW 20 MIN: CPT | Mod: 25

## 2024-11-12 PROCEDURE — 11900 INJECT SKIN LESIONS </W 7: CPT

## 2025-01-02 ENCOUNTER — APPOINTMENT (OUTPATIENT)
Dept: DERMATOLOGY | Facility: CLINIC | Age: 80
End: 2025-01-02
Payer: MEDICARE

## 2025-01-02 PROCEDURE — 99212 OFFICE O/P EST SF 10 MIN: CPT

## 2025-01-07 ENCOUNTER — APPOINTMENT (OUTPATIENT)
Dept: DERMATOLOGY | Facility: CLINIC | Age: 80
End: 2025-01-07

## 2025-05-01 ENCOUNTER — APPOINTMENT (OUTPATIENT)
Dept: DERMATOLOGY | Facility: CLINIC | Age: 80
End: 2025-05-01
Payer: MEDICARE

## 2025-05-01 PROCEDURE — 99213 OFFICE O/P EST LOW 20 MIN: CPT

## 2025-08-10 ENCOUNTER — EMERGENCY (EMERGENCY)
Facility: HOSPITAL | Age: 80
LOS: 1 days | End: 2025-08-10
Attending: EMERGENCY MEDICINE
Payer: MEDICARE

## 2025-08-10 VITALS
SYSTOLIC BLOOD PRESSURE: 139 MMHG | RESPIRATION RATE: 16 BRPM | TEMPERATURE: 98 F | OXYGEN SATURATION: 99 % | DIASTOLIC BLOOD PRESSURE: 73 MMHG | HEART RATE: 80 BPM

## 2025-08-10 VITALS
TEMPERATURE: 98 F | HEART RATE: 60 BPM | DIASTOLIC BLOOD PRESSURE: 79 MMHG | OXYGEN SATURATION: 99 % | RESPIRATION RATE: 16 BRPM | SYSTOLIC BLOOD PRESSURE: 137 MMHG

## 2025-08-10 DIAGNOSIS — Z98.89 OTHER SPECIFIED POSTPROCEDURAL STATES: Chronic | ICD-10-CM

## 2025-08-10 DIAGNOSIS — Z98.890 OTHER SPECIFIED POSTPROCEDURAL STATES: Chronic | ICD-10-CM

## 2025-08-10 DIAGNOSIS — Z95.0 PRESENCE OF CARDIAC PACEMAKER: Chronic | ICD-10-CM

## 2025-08-10 DIAGNOSIS — Z90.49 ACQUIRED ABSENCE OF OTHER SPECIFIED PARTS OF DIGESTIVE TRACT: Chronic | ICD-10-CM

## 2025-08-10 PROCEDURE — 70486 CT MAXILLOFACIAL W/O DYE: CPT

## 2025-08-10 PROCEDURE — 99284 EMERGENCY DEPT VISIT MOD MDM: CPT

## 2025-08-10 PROCEDURE — 99284 EMERGENCY DEPT VISIT MOD MDM: CPT | Mod: 25

## 2025-08-10 PROCEDURE — 72125 CT NECK SPINE W/O DYE: CPT | Mod: 26

## 2025-08-10 PROCEDURE — 70450 CT HEAD/BRAIN W/O DYE: CPT | Mod: 26

## 2025-08-10 PROCEDURE — 70450 CT HEAD/BRAIN W/O DYE: CPT

## 2025-08-10 PROCEDURE — 72125 CT NECK SPINE W/O DYE: CPT

## 2025-08-10 PROCEDURE — 70486 CT MAXILLOFACIAL W/O DYE: CPT | Mod: 26

## 2025-08-10 RX ORDER — ACETAMINOPHEN 500 MG/5ML
650 LIQUID (ML) ORAL ONCE
Refills: 0 | Status: COMPLETED | OUTPATIENT
Start: 2025-08-10 | End: 2025-08-10

## 2025-08-10 RX ADMIN — Medication 650 MILLIGRAM(S): at 12:22
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
## 2025-08-13 DIAGNOSIS — S00.11XA CONTUSION OF RIGHT EYELID AND PERIOCULAR AREA, INITIAL ENCOUNTER: ICD-10-CM

## 2025-08-13 DIAGNOSIS — Z95.0 PRESENCE OF CARDIAC PACEMAKER: ICD-10-CM

## 2025-08-13 DIAGNOSIS — I49.5 SICK SINUS SYNDROME: ICD-10-CM

## 2025-08-13 DIAGNOSIS — Z88.1 ALLERGY STATUS TO OTHER ANTIBIOTIC AGENTS: ICD-10-CM

## 2025-08-13 DIAGNOSIS — Z79.01 LONG TERM (CURRENT) USE OF ANTICOAGULANTS: ICD-10-CM

## 2025-08-26 ENCOUNTER — APPOINTMENT (OUTPATIENT)
Dept: DERMATOLOGY | Facility: CLINIC | Age: 80
End: 2025-08-26
Payer: MEDICARE

## 2025-08-26 PROCEDURE — 99213 OFFICE O/P EST LOW 20 MIN: CPT | Mod: 25

## 2025-08-26 PROCEDURE — 11102 TANGNTL BX SKIN SINGLE LES: CPT

## 2025-09-15 ENCOUNTER — NON-APPOINTMENT (OUTPATIENT)
Age: 80
End: 2025-09-15

## 2025-09-15 ENCOUNTER — APPOINTMENT (OUTPATIENT)
Dept: DERMATOLOGY | Facility: CLINIC | Age: 80
End: 2025-09-15
Payer: MEDICARE

## 2025-09-15 DIAGNOSIS — C44.712 BASAL CELL CARCINOMA OF SKIN OF RIGHT LOWER LIMB, INCLUDING HIP: ICD-10-CM

## 2025-09-15 DIAGNOSIS — S81.801A UNSPECIFIED OPEN WOUND, RIGHT LOWER LEG, INITIAL ENCOUNTER: ICD-10-CM

## 2025-09-15 PROCEDURE — 17313 MOHS 1 STAGE T/A/L: CPT

## 2025-09-15 RX ORDER — CEPHALEXIN 500 MG/1
500 CAPSULE ORAL 3 TIMES DAILY
Qty: 21 | Refills: 0 | Status: ACTIVE | COMMUNITY
Start: 2025-09-15 | End: 1900-01-01

## 2025-09-15 RX ORDER — MUPIROCIN 20 MG/G
2 OINTMENT TOPICAL TWICE DAILY
Qty: 1 | Refills: 6 | Status: ACTIVE | COMMUNITY
Start: 2025-09-15 | End: 1900-01-01

## 2025-09-16 PROBLEM — S81.801A LEG WOUND, RIGHT: Status: ACTIVE | Noted: 2025-09-16

## 2025-09-18 ENCOUNTER — APPOINTMENT (OUTPATIENT)
Dept: WOUND CARE | Facility: HOSPITAL | Age: 80
End: 2025-09-18
Payer: MEDICARE

## 2025-09-18 VITALS
WEIGHT: 265 LBS | DIASTOLIC BLOOD PRESSURE: 57 MMHG | OXYGEN SATURATION: 94 % | HEIGHT: 64 IN | BODY MASS INDEX: 45.24 KG/M2 | TEMPERATURE: 98.1 F | RESPIRATION RATE: 16 BRPM | HEART RATE: 60 BPM | SYSTOLIC BLOOD PRESSURE: 102 MMHG

## 2025-09-18 DIAGNOSIS — Z86.79 PERSONAL HISTORY OF OTHER DISEASES OF THE CIRCULATORY SYSTEM: ICD-10-CM

## 2025-09-18 DIAGNOSIS — I89.0 LYMPHEDEMA, NOT ELSEWHERE CLASSIFIED: ICD-10-CM

## 2025-09-18 DIAGNOSIS — T81.89XA OTHER COMPLICATIONS OF PROCEDURES, NOT ELSEWHERE CLASSIFIED, INITIAL ENCOUNTER: ICD-10-CM

## 2025-09-18 PROBLEM — Z85.828 HISTORY OF BASAL CELL CARCINOMA (BCC) OF SKIN: Status: ACTIVE | Noted: 2025-09-18

## 2025-09-18 PROCEDURE — 99202 OFFICE O/P NEW SF 15 MIN: CPT

## 2025-09-18 RX ORDER — SACUBITRIL AND VALSARTAN 24; 26 MG/1; MG/1
24-26 TABLET, FILM COATED ORAL
Refills: 0 | Status: ACTIVE | COMMUNITY

## 2025-09-18 RX ORDER — BACILLUS COAGULANS/INULIN 1B-250 MG
CAPSULE ORAL
Refills: 0 | Status: ACTIVE | COMMUNITY

## 2025-09-18 RX ORDER — ASPIRIN 325 MG
TABLET, DELAYED RELEASE (ENTERIC COATED) ORAL
Refills: 0 | Status: ACTIVE | COMMUNITY

## 2025-09-18 RX ORDER — FUROSEMIDE 40 MG/1
40 TABLET ORAL
Refills: 0 | Status: ACTIVE | COMMUNITY

## 2025-09-18 RX ORDER — DAPAGLIFLOZIN 10 MG/1
10 TABLET, FILM COATED ORAL
Refills: 0 | Status: ACTIVE | COMMUNITY

## 2025-09-18 RX ORDER — RIVAROXABAN 20 MG/1
20 TABLET, FILM COATED ORAL
Refills: 0 | Status: ACTIVE | COMMUNITY

## 2025-09-18 RX ORDER — METOPROLOL TARTRATE 50 MG/1
50 TABLET ORAL
Refills: 0 | Status: ACTIVE | COMMUNITY

## 2025-09-19 ENCOUNTER — NON-APPOINTMENT (OUTPATIENT)
Age: 80
End: 2025-09-19

## 2025-09-22 PROBLEM — M79.606: Status: ACTIVE | Noted: 2025-09-22

## 2025-09-22 PROBLEM — M79.606 CHRONIC PAIN OF LOWER EXTREMITY: Status: ACTIVE | Noted: 2025-09-22

## 2025-09-23 PROBLEM — R60.1 GENERALIZED EDEMA: Status: ACTIVE | Noted: 2025-09-23

## 2025-09-25 PROBLEM — T81.89XD DELAYED SURGICAL WOUND HEALING, SUBSEQUENT ENCOUNTER: Status: ACTIVE | Noted: 2025-09-25
